# Patient Record
Sex: FEMALE | Race: WHITE | NOT HISPANIC OR LATINO | Employment: PART TIME | URBAN - NONMETROPOLITAN AREA
[De-identification: names, ages, dates, MRNs, and addresses within clinical notes are randomized per-mention and may not be internally consistent; named-entity substitution may affect disease eponyms.]

---

## 2023-01-25 ENCOUNTER — OFFICE VISIT (OUTPATIENT)
Dept: FAMILY MEDICINE CLINIC | Facility: CLINIC | Age: 58
End: 2023-01-25
Payer: COMMERCIAL

## 2023-01-25 VITALS
OXYGEN SATURATION: 98 % | TEMPERATURE: 97.8 F | DIASTOLIC BLOOD PRESSURE: 79 MMHG | HEIGHT: 63 IN | WEIGHT: 183.6 LBS | SYSTOLIC BLOOD PRESSURE: 160 MMHG | RESPIRATION RATE: 20 BRPM | BODY MASS INDEX: 32.53 KG/M2 | HEART RATE: 77 BPM

## 2023-01-25 DIAGNOSIS — I10 BENIGN ESSENTIAL HYPERTENSION: Chronic | ICD-10-CM

## 2023-01-25 DIAGNOSIS — Z11.59 NEED FOR HEPATITIS C SCREENING TEST: ICD-10-CM

## 2023-01-25 DIAGNOSIS — E11.9 CONTROLLED TYPE 2 DIABETES MELLITUS WITHOUT COMPLICATION, WITHOUT LONG-TERM CURRENT USE OF INSULIN: Chronic | ICD-10-CM

## 2023-01-25 DIAGNOSIS — F41.9 ANXIETY: ICD-10-CM

## 2023-01-25 DIAGNOSIS — F33.1 MAJOR DEPRESSIVE DISORDER, RECURRENT, MODERATE: ICD-10-CM

## 2023-01-25 DIAGNOSIS — G57.93 NEUROPATHIC PAIN OF BOTH FEET: Primary | Chronic | ICD-10-CM

## 2023-01-25 PROCEDURE — 99204 OFFICE O/P NEW MOD 45 MIN: CPT | Performed by: FAMILY MEDICINE

## 2023-01-25 RX ORDER — MULTIPLE VITAMINS W/ MINERALS TAB 9MG-400MCG
1 TAB ORAL DAILY
COMMUNITY

## 2023-01-27 ENCOUNTER — PATIENT ROUNDING (BHMG ONLY) (OUTPATIENT)
Dept: FAMILY MEDICINE CLINIC | Facility: CLINIC | Age: 58
End: 2023-01-27
Payer: COMMERCIAL

## 2023-01-27 NOTE — PROGRESS NOTES
January 27, 2023    Hello, may I speak with Johnna Islas?    My name is Cary     I am  with [address] [address] [phone].    Before we get started may I verify your date of birth? 1965    I am calling to officially welcome you to our practice and ask about your recent visit. Is this a good time to talk? No lvm    Tell me about your visit with us. What things went well?         We're always looking for ways to make our patients' experiences even better. Do you have recommendations on ways we may improve?     Overall were you satisfied with your first visit to our practice?        I appreciate you taking the time to speak with me today. Is there anything else I can do for you?       Thank you, and have a great day.

## 2023-02-05 PROBLEM — G57.93 NEUROPATHIC PAIN OF BOTH FEET: Status: ACTIVE | Noted: 2021-04-26

## 2023-02-05 PROBLEM — F41.9 ANXIETY: Status: ACTIVE | Noted: 2023-02-05

## 2023-02-05 PROBLEM — F33.1 MAJOR DEPRESSIVE DISORDER, RECURRENT, MODERATE: Status: ACTIVE | Noted: 2023-02-05

## 2023-02-05 RX ORDER — LISINOPRIL 20 MG/1
20 TABLET ORAL DAILY
COMMUNITY
Start: 2022-11-15

## 2023-05-03 ENCOUNTER — OFFICE VISIT (OUTPATIENT)
Dept: FAMILY MEDICINE CLINIC | Facility: CLINIC | Age: 58
End: 2023-05-03
Payer: COMMERCIAL

## 2023-05-03 ENCOUNTER — LAB (OUTPATIENT)
Dept: LAB | Facility: HOSPITAL | Age: 58
End: 2023-05-03
Payer: COMMERCIAL

## 2023-05-03 VITALS
SYSTOLIC BLOOD PRESSURE: 151 MMHG | HEIGHT: 63 IN | BODY MASS INDEX: 32.67 KG/M2 | DIASTOLIC BLOOD PRESSURE: 80 MMHG | OXYGEN SATURATION: 99 % | RESPIRATION RATE: 12 BRPM | TEMPERATURE: 98.2 F | WEIGHT: 184.4 LBS | HEART RATE: 74 BPM

## 2023-05-03 DIAGNOSIS — E11.610 CONTROLLED TYPE 2 DIABETES MELLITUS WITH DIABETIC NEUROPATHIC ARTHROPATHY, WITHOUT LONG-TERM CURRENT USE OF INSULIN: Chronic | ICD-10-CM

## 2023-05-03 DIAGNOSIS — I10 BENIGN ESSENTIAL HYPERTENSION: Chronic | ICD-10-CM

## 2023-05-03 DIAGNOSIS — F41.9 ANXIETY: Chronic | ICD-10-CM

## 2023-05-03 DIAGNOSIS — E78.1 HYPERTRIGLYCERIDEMIA: Chronic | ICD-10-CM

## 2023-05-03 DIAGNOSIS — E11.42 DIABETIC POLYNEUROPATHY ASSOCIATED WITH TYPE 2 DIABETES MELLITUS: Chronic | ICD-10-CM

## 2023-05-03 DIAGNOSIS — G57.93 NEUROPATHIC PAIN OF BOTH FEET: ICD-10-CM

## 2023-05-03 DIAGNOSIS — Z79.899 MEDICATION MANAGEMENT: ICD-10-CM

## 2023-05-03 DIAGNOSIS — F41.9 ANXIETY: ICD-10-CM

## 2023-05-03 DIAGNOSIS — J02.0 STREP PHARYNGITIS: Primary | ICD-10-CM

## 2023-05-03 DIAGNOSIS — E11.9 CONTROLLED TYPE 2 DIABETES MELLITUS WITHOUT COMPLICATION, WITHOUT LONG-TERM CURRENT USE OF INSULIN: ICD-10-CM

## 2023-05-03 DIAGNOSIS — I10 BENIGN ESSENTIAL HYPERTENSION: ICD-10-CM

## 2023-05-03 LAB
ALBUMIN SERPL-MCNC: 4.4 G/DL (ref 3.5–5.2)
ALBUMIN UR-MCNC: <1.2 MG/DL
ALBUMIN/GLOB SERPL: 1.9 G/DL
ALP SERPL-CCNC: 42 U/L (ref 39–117)
ALT SERPL W P-5'-P-CCNC: 21 U/L (ref 1–33)
ANION GAP SERPL CALCULATED.3IONS-SCNC: 12.3 MMOL/L (ref 5–15)
AST SERPL-CCNC: 20 U/L (ref 1–32)
BILIRUB SERPL-MCNC: 0.3 MG/DL (ref 0–1.2)
BUN SERPL-MCNC: 11 MG/DL (ref 6–20)
BUN/CREAT SERPL: 23.4 (ref 7–25)
CALCIUM SPEC-SCNC: 10.2 MG/DL (ref 8.6–10.5)
CHLORIDE SERPL-SCNC: 100 MMOL/L (ref 98–107)
CHOLEST SERPL-MCNC: 151 MG/DL (ref 0–200)
CO2 SERPL-SCNC: 23.7 MMOL/L (ref 22–29)
CREAT SERPL-MCNC: 0.47 MG/DL (ref 0.57–1)
CREAT UR-MCNC: 51.3 MG/DL
DEPRECATED RDW RBC AUTO: 38.8 FL (ref 37–54)
EGFRCR SERPLBLD CKD-EPI 2021: 111.9 ML/MIN/1.73
ERYTHROCYTE [DISTWIDTH] IN BLOOD BY AUTOMATED COUNT: 12.8 % (ref 12.3–15.4)
FOLATE SERPL-MCNC: >20 NG/ML (ref 4.78–24.2)
GLOBULIN UR ELPH-MCNC: 2.3 GM/DL
GLUCOSE SERPL-MCNC: 113 MG/DL (ref 65–99)
HBA1C MFR BLD: 6 % (ref 4.8–5.6)
HCT VFR BLD AUTO: 37.4 % (ref 34–46.6)
HDLC SERPL-MCNC: 25 MG/DL (ref 40–60)
HGB BLD-MCNC: 12.9 G/DL (ref 12–15.9)
LDLC SERPL CALC-MCNC: 65 MG/DL (ref 0–100)
LDLC/HDLC SERPL: 1.9 {RATIO}
MCH RBC QN AUTO: 29.5 PG (ref 26.6–33)
MCHC RBC AUTO-ENTMCNC: 34.5 G/DL (ref 31.5–35.7)
MCV RBC AUTO: 85.6 FL (ref 79–97)
MICROALBUMIN/CREAT UR: NORMAL MG/G{CREAT}
PLATELET # BLD AUTO: 270 10*3/MM3 (ref 140–450)
PMV BLD AUTO: 9.6 FL (ref 6–12)
POC AMPHETAMINES: NEGATIVE
POC BARBITURATES: NEGATIVE
POC BENZODIAZEPHINES: NEGATIVE
POC COCAINE: NEGATIVE
POC METHADONE: NEGATIVE
POC METHAMPHETAMINE SCREEN URINE: NEGATIVE
POC OPIATES: NEGATIVE
POC OXYCODONE: NEGATIVE
POC PHENCYCLIDINE: NEGATIVE
POC PROPOXYPHENE: NEGATIVE
POC THC: NEGATIVE
POC TRICYCLIC ANTIDEPRESSANTS: NEGATIVE
POTASSIUM SERPL-SCNC: 4.3 MMOL/L (ref 3.5–5.2)
PROT SERPL-MCNC: 6.7 G/DL (ref 6–8.5)
RBC # BLD AUTO: 4.37 10*6/MM3 (ref 3.77–5.28)
SODIUM SERPL-SCNC: 136 MMOL/L (ref 136–145)
T4 FREE SERPL-MCNC: 1.08 NG/DL (ref 0.93–1.7)
TRIGL SERPL-MCNC: 393 MG/DL (ref 0–150)
TSH SERPL DL<=0.05 MIU/L-ACNC: 2.9 UIU/ML (ref 0.27–4.2)
VIT B12 BLD-MCNC: >2000 PG/ML (ref 211–946)
VLDLC SERPL-MCNC: 61 MG/DL (ref 5–40)
WBC NRBC COR # BLD: 6.81 10*3/MM3 (ref 3.4–10.8)

## 2023-05-03 PROCEDURE — 80305 DRUG TEST PRSMV DIR OPT OBS: CPT | Performed by: FAMILY MEDICINE

## 2023-05-03 PROCEDURE — 82570 ASSAY OF URINE CREATININE: CPT

## 2023-05-03 PROCEDURE — 80061 LIPID PANEL: CPT

## 2023-05-03 PROCEDURE — 80050 GENERAL HEALTH PANEL: CPT

## 2023-05-03 PROCEDURE — 82607 VITAMIN B-12: CPT

## 2023-05-03 PROCEDURE — 36415 COLL VENOUS BLD VENIPUNCTURE: CPT

## 2023-05-03 PROCEDURE — 84439 ASSAY OF FREE THYROXINE: CPT

## 2023-05-03 PROCEDURE — 83036 HEMOGLOBIN GLYCOSYLATED A1C: CPT

## 2023-05-03 PROCEDURE — 82746 ASSAY OF FOLIC ACID SERUM: CPT

## 2023-05-03 PROCEDURE — 82043 UR ALBUMIN QUANTITATIVE: CPT

## 2023-05-03 PROCEDURE — 99214 OFFICE O/P EST MOD 30 MIN: CPT | Performed by: FAMILY MEDICINE

## 2023-05-03 RX ORDER — AMOXICILLIN AND CLAVULANATE POTASSIUM 875; 125 MG/1; MG/1
1 TABLET, FILM COATED ORAL 2 TIMES DAILY
Qty: 20 TABLET | Refills: 0 | Status: SHIPPED | OUTPATIENT
Start: 2023-05-03

## 2023-05-03 RX ORDER — LISINOPRIL 30 MG/1
30 TABLET ORAL DAILY
Qty: 30 TABLET | Refills: 2 | Status: SHIPPED | OUTPATIENT
Start: 2023-05-03

## 2023-05-03 RX ORDER — ASPIRIN 81 MG/1
81 TABLET ORAL DAILY
Qty: 90 TABLET | Refills: 3 | Status: SHIPPED | OUTPATIENT
Start: 2023-05-03

## 2023-05-03 RX ORDER — GABAPENTIN 300 MG/1
300 CAPSULE ORAL 2 TIMES DAILY
Qty: 180 CAPSULE | Refills: 1 | Status: SHIPPED | OUTPATIENT
Start: 2023-05-03

## 2023-05-03 RX ORDER — ATORVASTATIN CALCIUM 10 MG/1
10 TABLET, FILM COATED ORAL EVERY EVENING
Qty: 90 TABLET | Refills: 3 | Status: SHIPPED | OUTPATIENT
Start: 2023-05-03

## 2023-05-03 RX ORDER — ASPIRIN 81 MG/1
81 TABLET ORAL DAILY
COMMUNITY
End: 2023-05-03 | Stop reason: SDUPTHER

## 2023-05-03 RX ORDER — METFORMIN HYDROCHLORIDE 500 MG/1
1000 TABLET, EXTENDED RELEASE ORAL 2 TIMES DAILY WITH MEALS
Qty: 360 TABLET | Refills: 3 | Status: SHIPPED | OUTPATIENT
Start: 2023-05-03

## 2023-05-03 NOTE — PROGRESS NOTES
"Chief Complaint  Med Refill and Annual Exam    Subjective        Johnna Islas presents to [facility]  History of Present Illness    Presents to follow up on medications and get refills, has chronic conditions type 2 diabetes on metformin, hyperlipidemia and high triglycerides related to diabetes, obesity, hypertension, diabetic neuropathy on medicine to treat and manage and complaining of sore throat, strep exposures at home without fever. Symptoms for a few days, no cough, short of breath or other. Has had sinus pain pressure for a week or longer though, over the counter medicines not helping this or throat.     Objective   Vital Signs:  /80   Pulse 74   Temp 98.2 °F (36.8 °C)   Resp 12   Ht 160 cm (63\")   Wt 83.6 kg (184 lb 6.4 oz)   SpO2 99%   BMI 32.66 kg/m²   Estimated body mass index is 32.66 kg/m² as calculated from the following:    Height as of this encounter: 160 cm (63\").    Weight as of this encounter: 83.6 kg (184 lb 6.4 oz).       BMI is >= 30 and <35. (Class 1 Obesity). The following options were offered after discussion;: exercise counseling/recommendations      Physical Exam  Vitals reviewed.   Constitutional:       Appearance: Normal appearance. She is well-developed.   HENT:      Head: Normocephalic and atraumatic.      Right Ear: External ear normal.      Left Ear: External ear normal.      Nose: Congestion present.      Right Sinus: Frontal sinus tenderness present.      Left Sinus: Frontal sinus tenderness present.      Mouth/Throat:      Pharynx: Oropharyngeal exudate and posterior oropharyngeal erythema present.   Eyes:      Conjunctiva/sclera: Conjunctivae normal.      Pupils: Pupils are equal, round, and reactive to light.   Cardiovascular:      Rate and Rhythm: Normal rate.   Pulmonary:      Effort: Pulmonary effort is normal.      Breath sounds: Normal breath sounds.   Abdominal:      General: There is no distension.   Feet:      " Comments: Decreased sensation in feet bilaterally, chronic neuropathy noted  Skin:     General: Skin is warm and dry.   Neurological:      General: No focal deficit present.      Mental Status: She is alert and oriented to person, place, and time.   Psychiatric:         Mood and Affect: Mood and affect normal.         Behavior: Behavior normal.         Thought Content: Thought content normal.         Judgment: Judgment normal.        Result Review :  The following data was reviewed by: Robson Jasso DO on 05/03/2023:  Common labs        11/15/2022    11:44 11/15/2022    13:13 5/3/2023    09:52 5/3/2023    10:08   Common Labs   Glucose   113      BUN   11      Creatinine   0.47      Sodium   136      Potassium   4.3      Chloride   100      Calcium   10.2      Albumin   4.4      Total Bilirubin   0.3      Alkaline Phosphatase   42      AST (SGOT)   20      ALT (SGPT)   21      WBC 7.7       6.81      Hemoglobin 12.6       12.9      Hematocrit 37.2       37.4      Platelets 313       270      Total Cholesterol   151      Total Cholesterol 151           Triglycerides 497       393      HDL Cholesterol 27       25      LDL Cholesterol  51       65      Hemoglobin A1C 6.1       6.00      Microalbumin, Urine  10       <1.2         This result is from an external source.                  Assessment and Plan   Diagnoses and all orders for this visit:    1. Strep pharyngitis (Primary)  -     amoxicillin-clavulanate (Augmentin) 875-125 MG per tablet; Take 1 tablet by mouth 2 (Two) Times a Day.  Dispense: 20 tablet; Refill: 0    2. Benign essential hypertension  Assessment & Plan:  Hypertension is unchanged.  Weight loss.  Regular aerobic exercise.  Medication changes per orders.  Blood pressure will be reassessed in 4 weeks   Goal <140/90    Orders:  -     Hemoglobin A1c; Standing  -     Comprehensive Metabolic Panel; Standing  -     Lipid Panel; Standing  -     Vitamin B12 & Folate; Standing  -     Microalbumin / Creatinine  Urine Ratio - Urine, Clean Catch; Future  -     TSH+Free T4; Standing  -     CBC (No Diff); Future  -     lisinopril (PRINIVIL,ZESTRIL) 30 MG tablet; Take 1 tablet by mouth Daily.  Dispense: 30 tablet; Refill: 2  -     aspirin 81 MG EC tablet; Take 1 tablet by mouth Daily.  Dispense: 90 tablet; Refill: 3    3. Diabetic polyneuropathy associated with type 2 diabetes mellitus  Assessment & Plan:  *manage  Gabapentin reviewed with patient, helps manage symptoms  Has longstanding diabetes and symptoms, complaints consistent with neuropathy  KALI reviewed, contract UDS appropriate and continue as prescribed    Orders:  -     Hemoglobin A1c; Standing  -     Comprehensive Metabolic Panel; Standing  -     Lipid Panel; Standing  -     Vitamin B12 & Folate; Standing  -     Microalbumin / Creatinine Urine Ratio - Urine, Clean Catch; Future  -     TSH+Free T4; Standing  -     POC Urine Drug Screen, Triage  -     CBC (No Diff); Future  -     gabapentin (NEURONTIN) 300 MG capsule; Take 1 capsule by mouth 2 (Two) Times a Day.  Dispense: 180 capsule; Refill: 1    4. Controlled type 2 diabetes mellitus with diabetic neuropathic arthropathy, without long-term current use of insulin  Assessment & Plan:  Diabetes is improving with treatment.   Continue current treatment regimen.  Diabetes will be reassessed in 3 months   Goal a1c <7, continue metformin  Recommend yearly eye and foot exam, update vaccinations     Orders:  -     Hemoglobin A1c; Standing  -     Comprehensive Metabolic Panel; Standing  -     Lipid Panel; Standing  -     Vitamin B12 & Folate; Standing  -     Microalbumin / Creatinine Urine Ratio - Urine, Clean Catch; Future  -     TSH+Free T4; Standing  -     CBC (No Diff); Future  -     metFORMIN ER (GLUCOPHAGE-XR) 500 MG 24 hr tablet; Take 2 tablets by mouth 2 (Two) Times a Day With Meals.  Dispense: 360 tablet; Refill: 3    5. Anxiety  Assessment & Plan:  *manage  Continue medicines and treatment at home  Follow up if  worse or more concerning symptoms, panic attacks or other    Orders:  -     Hemoglobin A1c; Standing  -     Comprehensive Metabolic Panel; Standing  -     Lipid Panel; Standing  -     Vitamin B12 & Folate; Standing  -     Microalbumin / Creatinine Urine Ratio - Urine, Clean Catch; Future  -     TSH+Free T4; Standing  -     CBC (No Diff); Future    6. Medication management  -     CBC (No Diff); Future    7. Hypertriglyceridemia  Assessment & Plan:  Lipid abnormalities are improving with treatment.  Pharmacotherapy as ordered.  Lipids will be reassessed in 3 months   Recheck with labs regularly, LDL goal <70  Lab Results   Component Value Date    CHOL 151 05/03/2023    CHLPL 151 11/15/2022    TRIG 393 (H) 05/03/2023    HDL 25 (L) 05/03/2023    LDL 65 05/03/2023         Orders:  -     atorvastatin (LIPITOR) 10 MG tablet; Take 1 tablet by mouth Every Evening.  Dispense: 90 tablet; Refill: 3           Follow Up   Return in about 3 months (around 8/3/2023) for Next scheduled follow up, Recheck.  Patient was given instructions and counseling regarding her condition or for health maintenance advice. Please see specific information pulled into the AVS if appropriate.       Answers for HPI/ROS submitted by the patient on 5/2/2023  Please describe your symptoms.: Labs and med refill  Have you had these symptoms before?: No  How long have you been having these symptoms?: Greater than 2 weeks  What is the primary reason for your visit?: Other

## 2023-05-21 PROBLEM — J32.9 SINUSITIS: Status: ACTIVE | Noted: 2023-05-03

## 2023-05-21 PROBLEM — J02.0 STREP PHARYNGITIS: Status: ACTIVE | Noted: 2023-05-21

## 2023-05-21 PROBLEM — G57.93 NEUROPATHIC PAIN OF BOTH FEET: Chronic | Status: ACTIVE | Noted: 2021-04-26

## 2023-05-21 PROBLEM — F41.9 ANXIETY: Chronic | Status: ACTIVE | Noted: 2023-02-05

## 2023-05-21 NOTE — ASSESSMENT & PLAN NOTE
Treatment with antibiotics, follow up if no better or further development of symptoms or other concerns as appropriate  Counseled on continued treatment at home with over the counter medicines and avoiding allergens, triggers, getting plenty of rest, fluids

## 2023-05-21 NOTE — ASSESSMENT & PLAN NOTE
*manage  Continue medicines and treatment at home  Follow up if worse or more concerning symptoms, panic attacks or other

## 2023-05-21 NOTE — ASSESSMENT & PLAN NOTE
Hypertension is unchanged.  Weight loss.  Regular aerobic exercise.  Medication changes per orders.  Blood pressure will be reassessed in 4 weeks   Goal <140/90

## 2023-05-21 NOTE — ASSESSMENT & PLAN NOTE
*manage  Gabapentin reviewed with patient, helps manage symptoms  Has longstanding diabetes and symptoms, complaints consistent with neuropathy  KALI reviewed, contract UDS appropriate and continue as prescribed

## 2023-05-21 NOTE — ASSESSMENT & PLAN NOTE
Lipid abnormalities are improving with treatment.  Pharmacotherapy as ordered.  Lipids will be reassessed in 3 months   Recheck with labs regularly, LDL goal <70  Lab Results   Component Value Date    CHOL 151 05/03/2023    CHLPL 151 11/15/2022    TRIG 393 (H) 05/03/2023    HDL 25 (L) 05/03/2023    LDL 65 05/03/2023

## 2023-05-21 NOTE — ASSESSMENT & PLAN NOTE
Diabetes is improving with treatment.   Continue current treatment regimen.  Diabetes will be reassessed in 3 months   Goal a1c <7, continue metformin  Recommend yearly eye and foot exam, update vaccinations

## 2023-07-21 ENCOUNTER — OFFICE VISIT (OUTPATIENT)
Dept: FAMILY MEDICINE CLINIC | Facility: CLINIC | Age: 58
End: 2023-07-21
Payer: COMMERCIAL

## 2023-07-21 VITALS
SYSTOLIC BLOOD PRESSURE: 143 MMHG | BODY MASS INDEX: 32.46 KG/M2 | TEMPERATURE: 98 F | WEIGHT: 183.2 LBS | HEIGHT: 63 IN | DIASTOLIC BLOOD PRESSURE: 78 MMHG | HEART RATE: 69 BPM | OXYGEN SATURATION: 100 %

## 2023-07-21 DIAGNOSIS — J40 BRONCHITIS: Primary | ICD-10-CM

## 2023-07-21 DIAGNOSIS — I10 BENIGN ESSENTIAL HYPERTENSION: Chronic | ICD-10-CM

## 2023-07-21 DIAGNOSIS — E11.9 CONTROLLED TYPE 2 DIABETES MELLITUS WITHOUT COMPLICATION, WITHOUT LONG-TERM CURRENT USE OF INSULIN: Chronic | ICD-10-CM

## 2023-07-21 PROCEDURE — 99214 OFFICE O/P EST MOD 30 MIN: CPT

## 2023-07-21 RX ORDER — METHYLPREDNISOLONE 4 MG/1
TABLET ORAL
Qty: 1 EACH | Refills: 0 | Status: SHIPPED | OUTPATIENT
Start: 2023-07-21

## 2023-07-21 RX ORDER — BENZONATATE 200 MG/1
CAPSULE ORAL
COMMUNITY
Start: 2023-07-20

## 2023-07-21 RX ORDER — ALBUTEROL SULFATE 90 UG/1
AEROSOL, METERED RESPIRATORY (INHALATION)
COMMUNITY
Start: 2023-07-20

## 2023-07-21 RX ORDER — AMOXICILLIN AND CLAVULANATE POTASSIUM 875; 125 MG/1; MG/1
1 TABLET, FILM COATED ORAL 2 TIMES DAILY
Qty: 20 TABLET | Refills: 0 | Status: SHIPPED | OUTPATIENT
Start: 2023-07-21 | End: 2023-07-31

## 2023-07-21 NOTE — PROGRESS NOTES
Chief Complaint   Patient presents with    Cough     Symptoms started Monday, cough is making her hoarse. She took a home test yesterday morning and it was negative, she also done an E-visit yesterday and was told she had a cold.     Nasal Congestion     Chest congestion     Ear Fullness       Subjective          Johnna Islas presents to [facility]    History of Present Illness  Johnna is here to be seen for sinus symptoms that started 2 weeks ago and now she has started getting the deep cough with green mucus on Monday. She also has ear fullness and chest congestion. 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 She saw an e-visit provider yesterday and was given tessalon perles, mucinex, and albuterol inhaler.     She has a history of hypertension (/78 today) and Type 2 Diabetes that is controlled with metformin. Due to her being sick for over 2 weeks I do not believe testing her for covid/flu is necessary at this point. This seems to be a secondary infection.     Upon examination her left tympanic membrane is bulging. Lungs are clear PRINCESS. She does have green mucus she is coughing up.     Past History:  Medical History: has a past medical history of Bell's palsy, Diabetes mellitus, Hyperlipidemia, and Hypertension.   Surgical History: has a past surgical history that includes Hysterectomy;  section; Tonsillectomy; Adenoidectomy; Cholecystectomy; and Appendectomy.   Family History: family history includes Atrial fibrillation in her mother; Diabetes in her father; Heart disease in her father; Hyperlipidemia in her father; Hypertension in her mother; Stroke in her father; Thyroid disease in her mother.   Social History: reports that she has never smoked. She has never been exposed to tobacco smoke. She has never used smokeless tobacco. She reports that she does not currently use alcohol. She reports that she does not use  "drugs.  Allergies: Fenofibrate micronized, Oxycodone-acetaminophen, and Measles mumps and rubella virus vaccine live  (Not in a hospital admission)       Social History     Socioeconomic History    Marital status:    Tobacco Use    Smoking status: Never     Passive exposure: Never    Smokeless tobacco: Never   Vaping Use    Vaping Use: Never used   Substance and Sexual Activity    Alcohol use: Not Currently    Drug use: Never    Sexual activity: Defer       There are no preventive care reminders to display for this patient.    Objective     Vital Signs:   /78 (BP Location: Left arm, Patient Position: Sitting)   Pulse 69   Temp 98 °F (36.7 °C) (Infrared)   Ht 160 cm (63\")   Wt 83.1 kg (183 lb 3.2 oz)   SpO2 100%   BMI 32.45 kg/m²       Physical Exam  Constitutional:       Appearance: Normal appearance.   HENT:      Right Ear: Tympanic membrane normal.      Left Ear: Tympanic membrane is bulging.      Nose: Nose normal.      Mouth/Throat:      Mouth: Mucous membranes are moist.   Cardiovascular:      Rate and Rhythm: Normal rate and regular rhythm.      Pulses: Normal pulses.      Heart sounds: Normal heart sounds.   Pulmonary:      Effort: Pulmonary effort is normal.      Breath sounds: Normal breath sounds.   Skin:     General: Skin is warm and dry.   Neurological:      General: No focal deficit present.      Mental Status: She is alert and oriented to person, place, and time.   Psychiatric:         Mood and Affect: Mood normal.         Behavior: Behavior normal.        Review of Systems   HENT:  Positive for congestion and ear pain.    Respiratory:  Positive for cough.    All other systems reviewed and are negative.     Result Review :                 Assessment and Plan    Diagnoses and all orders for this visit:    1. Bronchitis (Primary)  -     methylPREDNISolone (MEDROL) 4 MG dose pack; Take as directed on package instructions.  Dispense: 1 each; Refill: 0  -     amoxicillin-clavulanate " (AUGMENTIN) 875-125 MG per tablet; Take 1 tablet by mouth 2 (Two) Times a Day for 10 days.  Dispense: 20 tablet; Refill: 0    2. Controlled type 2 diabetes mellitus without complication, without long-term current use of insulin  Comments:  Check blood sugar at home daily to monitor for elevation due to methylprednisolone use.    3. Benign essential hypertension  Comments:  /78  Continue lisinopril 30 mg daily  Check daily while on medrol dose pack. Discontinue medrol dose pack if symptomatic elevation present.          Pt thought to be clinically stable at this time.    Follow Up   Return if symptoms worsen or fail to improve.  Patient was given instructions and counseling regarding her condition or for health maintenance advice. Please see specific information pulled into the AVS if appropriate.

## 2023-07-24 DIAGNOSIS — J40 BRONCHITIS: Primary | ICD-10-CM

## 2023-07-24 RX ORDER — AZITHROMYCIN 500 MG/1
500 TABLET, FILM COATED ORAL DAILY
Qty: 5 TABLET | Refills: 0 | Status: SHIPPED | OUTPATIENT
Start: 2023-07-24 | End: 2023-07-29

## 2023-08-30 DIAGNOSIS — I10 BENIGN ESSENTIAL HYPERTENSION: Chronic | ICD-10-CM

## 2023-08-31 RX ORDER — LISINOPRIL 30 MG/1
30 TABLET ORAL DAILY
Qty: 30 TABLET | Refills: 2 | Status: SHIPPED | OUTPATIENT
Start: 2023-08-31

## 2023-09-25 ENCOUNTER — OFFICE VISIT (OUTPATIENT)
Dept: FAMILY MEDICINE CLINIC | Facility: CLINIC | Age: 58
End: 2023-09-25

## 2023-09-25 VITALS
WEIGHT: 183.2 LBS | HEART RATE: 68 BPM | OXYGEN SATURATION: 98 % | HEIGHT: 63 IN | TEMPERATURE: 97.7 F | BODY MASS INDEX: 32.46 KG/M2 | DIASTOLIC BLOOD PRESSURE: 78 MMHG | SYSTOLIC BLOOD PRESSURE: 143 MMHG

## 2023-09-25 DIAGNOSIS — E78.1 HYPERTRIGLYCERIDEMIA: Chronic | ICD-10-CM

## 2023-09-25 DIAGNOSIS — E11.42 DIABETIC POLYNEUROPATHY ASSOCIATED WITH TYPE 2 DIABETES MELLITUS: Chronic | ICD-10-CM

## 2023-09-25 DIAGNOSIS — I10 BENIGN ESSENTIAL HYPERTENSION: Chronic | ICD-10-CM

## 2023-09-25 DIAGNOSIS — E11.610 CONTROLLED TYPE 2 DIABETES MELLITUS WITH DIABETIC NEUROPATHIC ARTHROPATHY, WITHOUT LONG-TERM CURRENT USE OF INSULIN: Chronic | ICD-10-CM

## 2023-09-25 PROCEDURE — 99214 OFFICE O/P EST MOD 30 MIN: CPT

## 2023-09-25 RX ORDER — LISINOPRIL 30 MG/1
30 TABLET ORAL DAILY
Qty: 90 TABLET | Refills: 3 | Status: SHIPPED | OUTPATIENT
Start: 2023-09-25

## 2023-09-25 RX ORDER — METFORMIN HYDROCHLORIDE 500 MG/1
1000 TABLET, EXTENDED RELEASE ORAL 2 TIMES DAILY WITH MEALS
Qty: 360 TABLET | Refills: 3 | Status: SHIPPED | OUTPATIENT
Start: 2023-09-25

## 2023-09-25 RX ORDER — ATORVASTATIN CALCIUM 10 MG/1
10 TABLET, FILM COATED ORAL EVERY EVENING
Qty: 90 TABLET | Refills: 3 | Status: SHIPPED | OUTPATIENT
Start: 2023-09-25

## 2023-09-25 RX ORDER — GABAPENTIN 300 MG/1
300 CAPSULE ORAL 2 TIMES DAILY
Qty: 180 CAPSULE | Refills: 1 | Status: CANCELLED | OUTPATIENT
Start: 2023-09-25

## 2023-09-25 NOTE — ASSESSMENT & PLAN NOTE
Hypertension is unchanged.  Continue current medications.  Blood pressure will be reassessed in 3 months.

## 2023-09-25 NOTE — PROGRESS NOTES
Chief Complaint   Patient presents with    Med Refill     Here for medication refills and fasting as well if she needs to do labs.        Subjective          Johnna Islas presents to [facility]    History of Present Illness  Larisa is here to be seen for medication refills. She needs all of her medications refilled and wants the non controlled medications 90 days at a time. She has a BMI of 32.45 and a BP of 143/78. She has no complaints today. She is not due for labs until December.     Reviewed labs and medications    Past History:  Medical History: has a past medical history of Bell's palsy, Diabetes mellitus, Hyperlipidemia, and Hypertension.   Surgical History: has a past surgical history that includes Hysterectomy;  section; Tonsillectomy; Adenoidectomy; Cholecystectomy; and Appendectomy.   Family History: family history includes Atrial fibrillation in her mother; Diabetes in her father; Heart disease in her father; Hyperlipidemia in her father; Hypertension in her mother; Stroke in her father; Thyroid disease in her mother.   Social History: reports that she has never smoked. She has never been exposed to tobacco smoke. She has never used smokeless tobacco. She reports that she does not currently use alcohol. She reports that she does not use drugs.  Allergies: Fenofibrate micronized, Oxycodone-acetaminophen, and Measles mumps and rubella virus vaccine live  (Not in a hospital admission)       Social History     Socioeconomic History    Marital status:    Tobacco Use    Smoking status: Never     Passive exposure: Never    Smokeless tobacco: Never   Vaping Use    Vaping Use: Never used   Substance and Sexual Activity    Alcohol use: Not Currently    Drug use: Never    Sexual activity: Defer       There are no preventive care reminders to display for this patient.    Objective     Vital Signs:   /78 (BP Location: Left arm, Patient Position: Sitting)    "Pulse 68   Temp 97.7 °F (36.5 °C) (Infrared)   Ht 160 cm (63\")   Wt 83.1 kg (183 lb 3.2 oz)   SpO2 98%   BMI 32.45 kg/m²       Physical Exam  Constitutional:       Appearance: Normal appearance.   HENT:      Nose: Nose normal.      Mouth/Throat:      Mouth: Mucous membranes are moist.   Cardiovascular:      Rate and Rhythm: Normal rate and regular rhythm.      Pulses: Normal pulses.      Heart sounds: Normal heart sounds.   Pulmonary:      Effort: Pulmonary effort is normal.      Breath sounds: Normal breath sounds.   Skin:     General: Skin is warm and dry.   Neurological:      General: No focal deficit present.      Mental Status: She is alert and oriented to person, place, and time.   Psychiatric:         Mood and Affect: Mood normal.         Behavior: Behavior normal.        Review of Systems     Result Review :                 Assessment and Plan    Diagnoses and all orders for this visit:    1. Benign essential hypertension  Assessment & Plan:  Hypertension is unchanged.  Continue current medications.  Blood pressure will be reassessed in 3 months.    Orders:  -     lisinopril (PRINIVIL,ZESTRIL) 30 MG tablet; Take 1 tablet by mouth Daily.  Dispense: 90 tablet; Refill: 3    2. Controlled type 2 diabetes mellitus with diabetic neuropathic arthropathy, without long-term current use of insulin  Assessment & Plan:  Diabetes is improving with treatment.   Continue current treatment regimen.  Diabetes will be reassessed in 3 months.    Orders:  -     metFORMIN ER (GLUCOPHAGE-XR) 500 MG 24 hr tablet; Take 2 tablets by mouth 2 (Two) Times a Day With Meals.  Dispense: 360 tablet; Refill: 3    3. Hypertriglyceridemia  Assessment & Plan:  Lipid abnormalities are unchanged.  Pharmacotherapy as ordered.  Lipids will be reassessed in 3 months.    Orders:  -     atorvastatin (LIPITOR) 10 MG tablet; Take 1 tablet by mouth Every Evening.  Dispense: 90 tablet; Refill: 3    4. Diabetic polyneuropathy associated with type 2 " diabetes mellitus  Comments:  Continue on gabapentin as ordered.          Pt thought to be clinically stable at this time.    Follow Up   Return if symptoms worsen or fail to improve.  Patient was given instructions and counseling regarding her condition or for health maintenance advice. Please see specific information pulled into the AVS if appropriate.

## 2023-11-15 ENCOUNTER — OFFICE VISIT (OUTPATIENT)
Dept: FAMILY MEDICINE CLINIC | Facility: CLINIC | Age: 58
End: 2023-11-15
Payer: COMMERCIAL

## 2023-11-15 ENCOUNTER — LAB (OUTPATIENT)
Dept: LAB | Facility: HOSPITAL | Age: 58
End: 2023-11-15
Payer: COMMERCIAL

## 2023-11-15 VITALS
SYSTOLIC BLOOD PRESSURE: 152 MMHG | TEMPERATURE: 98.2 F | DIASTOLIC BLOOD PRESSURE: 75 MMHG | RESPIRATION RATE: 16 BRPM | BODY MASS INDEX: 32.07 KG/M2 | WEIGHT: 181 LBS | HEIGHT: 63 IN | OXYGEN SATURATION: 94 % | HEART RATE: 75 BPM

## 2023-11-15 DIAGNOSIS — N64.4 NIPPLE PAIN: ICD-10-CM

## 2023-11-15 DIAGNOSIS — J01.10 ACUTE NON-RECURRENT FRONTAL SINUSITIS: ICD-10-CM

## 2023-11-15 DIAGNOSIS — F41.9 ANXIETY: ICD-10-CM

## 2023-11-15 DIAGNOSIS — H65.01 NON-RECURRENT ACUTE SEROUS OTITIS MEDIA OF RIGHT EAR: ICD-10-CM

## 2023-11-15 DIAGNOSIS — E11.42 DIABETIC POLYNEUROPATHY ASSOCIATED WITH TYPE 2 DIABETES MELLITUS: Chronic | ICD-10-CM

## 2023-11-15 DIAGNOSIS — E78.1 HYPERTRIGLYCERIDEMIA: Chronic | ICD-10-CM

## 2023-11-15 DIAGNOSIS — I10 BENIGN ESSENTIAL HYPERTENSION: Chronic | ICD-10-CM

## 2023-11-15 DIAGNOSIS — E11.9 CONTROLLED TYPE 2 DIABETES MELLITUS WITHOUT COMPLICATION, WITHOUT LONG-TERM CURRENT USE OF INSULIN: ICD-10-CM

## 2023-11-15 DIAGNOSIS — I10 BENIGN ESSENTIAL HYPERTENSION: ICD-10-CM

## 2023-11-15 DIAGNOSIS — E11.610 CONTROLLED TYPE 2 DIABETES MELLITUS WITH DIABETIC NEUROPATHIC ARTHROPATHY, WITHOUT LONG-TERM CURRENT USE OF INSULIN: Primary | ICD-10-CM

## 2023-11-15 DIAGNOSIS — G57.93 NEUROPATHIC PAIN OF BOTH FEET: ICD-10-CM

## 2023-11-15 DIAGNOSIS — E11.610 CONTROLLED TYPE 2 DIABETES MELLITUS WITH DIABETIC NEUROPATHIC ARTHROPATHY, WITHOUT LONG-TERM CURRENT USE OF INSULIN: ICD-10-CM

## 2023-11-15 LAB
ALBUMIN SERPL-MCNC: 4.5 G/DL (ref 3.5–5.2)
ALBUMIN/GLOB SERPL: 1.7 G/DL
ALP SERPL-CCNC: 42 U/L (ref 39–117)
ALT SERPL W P-5'-P-CCNC: 25 U/L (ref 1–33)
ANION GAP SERPL CALCULATED.3IONS-SCNC: 13.1 MMOL/L (ref 5–15)
AST SERPL-CCNC: 22 U/L (ref 1–32)
BILIRUB SERPL-MCNC: 0.4 MG/DL (ref 0–1.2)
BUN SERPL-MCNC: 10 MG/DL (ref 6–20)
BUN/CREAT SERPL: 15.2 (ref 7–25)
CALCIUM SPEC-SCNC: 10.1 MG/DL (ref 8.6–10.5)
CHLORIDE SERPL-SCNC: 97 MMOL/L (ref 98–107)
CHOLEST SERPL-MCNC: 165 MG/DL (ref 0–200)
CO2 SERPL-SCNC: 23.9 MMOL/L (ref 22–29)
CREAT SERPL-MCNC: 0.66 MG/DL (ref 0.57–1)
EGFRCR SERPLBLD CKD-EPI 2021: 102.5 ML/MIN/1.73
FOLATE SERPL-MCNC: >20 NG/ML (ref 4.78–24.2)
GLOBULIN UR ELPH-MCNC: 2.6 GM/DL
GLUCOSE SERPL-MCNC: 101 MG/DL (ref 65–99)
HBA1C MFR BLD: 6 % (ref 4.8–5.6)
HDLC SERPL-MCNC: 28 MG/DL (ref 40–60)
LDLC SERPL CALC-MCNC: 69 MG/DL (ref 0–100)
LDLC/HDLC SERPL: 1.81 {RATIO}
POTASSIUM SERPL-SCNC: 4.6 MMOL/L (ref 3.5–5.2)
PROT SERPL-MCNC: 7.1 G/DL (ref 6–8.5)
SODIUM SERPL-SCNC: 134 MMOL/L (ref 136–145)
T4 FREE SERPL-MCNC: 1.08 NG/DL (ref 0.93–1.7)
TRIGL SERPL-MCNC: 431 MG/DL (ref 0–150)
TSH SERPL DL<=0.05 MIU/L-ACNC: 2.02 UIU/ML (ref 0.27–4.2)
VIT B12 BLD-MCNC: >2000 PG/ML (ref 211–946)
VLDLC SERPL-MCNC: 68 MG/DL (ref 5–40)

## 2023-11-15 PROCEDURE — 36415 COLL VENOUS BLD VENIPUNCTURE: CPT

## 2023-11-15 PROCEDURE — 84439 ASSAY OF FREE THYROXINE: CPT

## 2023-11-15 PROCEDURE — 80061 LIPID PANEL: CPT

## 2023-11-15 PROCEDURE — 84443 ASSAY THYROID STIM HORMONE: CPT

## 2023-11-15 PROCEDURE — 82746 ASSAY OF FOLIC ACID SERUM: CPT

## 2023-11-15 PROCEDURE — 83036 HEMOGLOBIN GLYCOSYLATED A1C: CPT

## 2023-11-15 PROCEDURE — 82607 VITAMIN B-12: CPT

## 2023-11-15 PROCEDURE — 80053 COMPREHEN METABOLIC PANEL: CPT

## 2023-11-15 PROCEDURE — 99214 OFFICE O/P EST MOD 30 MIN: CPT

## 2023-11-15 RX ORDER — LISINOPRIL 40 MG/1
40 TABLET ORAL DAILY
Qty: 30 TABLET | Refills: 3 | Status: SHIPPED | OUTPATIENT
Start: 2023-11-15

## 2023-11-15 RX ORDER — ATORVASTATIN CALCIUM 10 MG/1
10 TABLET, FILM COATED ORAL EVERY EVENING
Qty: 90 TABLET | Refills: 3 | Status: SHIPPED | OUTPATIENT
Start: 2023-11-15 | End: 2023-11-16 | Stop reason: DRUGHIGH

## 2023-11-15 RX ORDER — AMOXICILLIN AND CLAVULANATE POTASSIUM 875; 125 MG/1; MG/1
1 TABLET, FILM COATED ORAL 2 TIMES DAILY
Qty: 28 TABLET | Refills: 0 | Status: SHIPPED | OUTPATIENT
Start: 2023-11-15 | End: 2023-11-29

## 2023-11-15 RX ORDER — GABAPENTIN 300 MG/1
300 CAPSULE ORAL 2 TIMES DAILY
Qty: 180 CAPSULE | Refills: 1 | Status: SHIPPED | OUTPATIENT
Start: 2023-11-15

## 2023-11-15 NOTE — PROGRESS NOTES
"Chief Complaint  nipple pain (Left nipple 2 weeks sharp intermittent shooting pain. Does not go into chest wall ), Earache (Right ear 1 week ), and Med Refill    Subjective        Johnna Islas presents to [facility]  History of Present Illness  Larisa presents to the clinic with complaints of left nipple (in the 3 o'clock to 6 o'clock area) that is intermittent and has been going on for about 2 weeks. She describes the pain as a sharp pain when it hits and it stays in the nipple area and does not go to the chest wall. Denies any discharge, bleeding, or lumps. She had a mammogram completed in March and it was okay.     She also has complaints of right ear pain and fullness in her left ear. She has some nasal congestion, post nasal drip, and sinus pressure. Denies any chest pain, SOA, N/V/D, fever, chills, body aches. This has been going on for about a week and a half. She has been using some saline spray and saline gel.     She is also in need of lab work and medication refills.       Objective   Vital Signs:  /75   Pulse 75   Temp 98.2 °F (36.8 °C)   Resp 16   Ht 160 cm (62.99\")   Wt 82.1 kg (181 lb)   SpO2 94%   BMI 32.07 kg/m²   Estimated body mass index is 32.07 kg/m² as calculated from the following:    Height as of this encounter: 160 cm (62.99\").    Weight as of this encounter: 82.1 kg (181 lb).               Physical Exam  Constitutional:       Appearance: Normal appearance.   HENT:      Right Ear: A middle ear effusion is present. Tympanic membrane is bulging.   Cardiovascular:      Rate and Rhythm: Normal rate and regular rhythm.      Pulses: Normal pulses.      Heart sounds: Normal heart sounds.   Pulmonary:      Effort: Pulmonary effort is normal.      Breath sounds: Normal breath sounds.   Chest:       Skin:     General: Skin is warm and dry.   Neurological:      General: No focal deficit present.      Mental Status: She is alert and oriented to person, " place, and time.   Psychiatric:         Mood and Affect: Mood normal.         Behavior: Behavior normal.        Result Review :                   Assessment and Plan   Diagnoses and all orders for this visit:    1. Controlled type 2 diabetes mellitus with diabetic neuropathic arthropathy, without long-term current use of insulin (Primary)  Assessment & Plan:  Diabetes is improving with treatment.   Continue current treatment regimen.  Diabetes will be reassessed in 6 months.    Orders:  -     ORDER: Hemoglobin A1c; Future    2. Diabetic polyneuropathy associated with type 2 diabetes mellitus  -     gabapentin (NEURONTIN) 300 MG capsule; Take 1 capsule by mouth 2 (Two) Times a Day.  Dispense: 180 capsule; Refill: 1    3. Benign essential hypertension  Assessment & Plan:  Hypertension is worsening.  Medication changes per orders.  Blood pressure will be reassessed at the next regular appointment.    Orders:  -     lisinopril (PRINIVIL,ZESTRIL) 40 MG tablet; Take 1 tablet by mouth Daily.  Dispense: 30 tablet; Refill: 3    4. Hypertriglyceridemia  -     atorvastatin (LIPITOR) 10 MG tablet; Take 1 tablet by mouth Every Evening.  Dispense: 90 tablet; Refill: 3    5. Acute non-recurrent frontal sinusitis  -     amoxicillin-clavulanate (AUGMENTIN) 875-125 MG per tablet; Take 1 tablet by mouth 2 (Two) Times a Day for 14 days.  Dispense: 28 tablet; Refill: 0    6. Non-recurrent acute serous otitis media of right ear  -     amoxicillin-clavulanate (AUGMENTIN) 875-125 MG per tablet; Take 1 tablet by mouth 2 (Two) Times a Day for 14 days.  Dispense: 28 tablet; Refill: 0    7. Nipple pain  -     Mammo Diagnostic Digital Tomosynthesis Bilateral With CAD; Future  -     US Breast Left Limited; Future             Follow Up   Return in about 3 months (around 2/15/2024), or if symptoms worsen or fail to improve.  Patient was given instructions and counseling regarding her condition or for health maintenance advice. Please see specific  information pulled into the AVS if appropriate.         Answers submitted by the patient for this visit:  Other (Submitted on 11/14/2023)  Please describe your symptoms.: Breast pain, Medication refill, B/P check  Have you had these symptoms before?: No  How long have you been having these symptoms?: 1-2 weeks  Please list any medications you are currently taking for this condition.: None  Please describe any probable cause for these symptoms. : N/A  Primary Reason for Visit (Submitted on 11/14/2023)  What is the primary reason for your visit?: Other

## 2023-11-16 DIAGNOSIS — E11.9 CONTROLLED TYPE 2 DIABETES MELLITUS WITHOUT COMPLICATION, WITHOUT LONG-TERM CURRENT USE OF INSULIN: Primary | ICD-10-CM

## 2023-11-16 DIAGNOSIS — E78.1 HYPERTRIGLYCERIDEMIA: ICD-10-CM

## 2023-11-16 RX ORDER — ATORVASTATIN CALCIUM 20 MG/1
20 TABLET, FILM COATED ORAL NIGHTLY
Qty: 90 TABLET | Refills: 3 | Status: SHIPPED | OUTPATIENT
Start: 2023-11-16

## 2023-11-27 ENCOUNTER — HOSPITAL ENCOUNTER (EMERGENCY)
Facility: HOSPITAL | Age: 58
Discharge: HOME | End: 2023-11-27
Attending: EMERGENCY MEDICINE | Admitting: EMERGENCY MEDICINE
Payer: COMMERCIAL

## 2023-11-27 ENCOUNTER — APPOINTMENT (OUTPATIENT)
Dept: GENERAL RADIOLOGY | Facility: HOSPITAL | Age: 58
End: 2023-11-27
Payer: COMMERCIAL

## 2023-11-27 VITALS
SYSTOLIC BLOOD PRESSURE: 167 MMHG | DIASTOLIC BLOOD PRESSURE: 85 MMHG | HEIGHT: 63 IN | WEIGHT: 181 LBS | RESPIRATION RATE: 18 BRPM | OXYGEN SATURATION: 94 % | HEART RATE: 81 BPM | TEMPERATURE: 98.1 F | BODY MASS INDEX: 32.07 KG/M2

## 2023-11-27 DIAGNOSIS — I15.9 SECONDARY HYPERTENSION: Primary | ICD-10-CM

## 2023-11-27 LAB
ALBUMIN SERPL-MCNC: 4.5 G/DL (ref 3.5–5.2)
ALBUMIN/GLOB SERPL: 1.7 G/DL
ALP SERPL-CCNC: 45 U/L (ref 39–117)
ALT SERPL W P-5'-P-CCNC: 24 U/L (ref 1–33)
ANION GAP SERPL CALCULATED.3IONS-SCNC: 13.4 MMOL/L (ref 5–15)
AST SERPL-CCNC: 21 U/L (ref 1–32)
BASOPHILS # BLD AUTO: 0.08 10*3/MM3 (ref 0–0.2)
BASOPHILS NFR BLD AUTO: 1 % (ref 0–1.5)
BILIRUB SERPL-MCNC: 0.3 MG/DL (ref 0–1.2)
BUN SERPL-MCNC: 11 MG/DL (ref 6–20)
BUN/CREAT SERPL: 18.6 (ref 7–25)
CALCIUM SPEC-SCNC: 10.3 MG/DL (ref 8.6–10.5)
CHLORIDE SERPL-SCNC: 96 MMOL/L (ref 98–107)
CO2 SERPL-SCNC: 22.6 MMOL/L (ref 22–29)
CREAT SERPL-MCNC: 0.59 MG/DL (ref 0.57–1)
DEPRECATED RDW RBC AUTO: 39.9 FL (ref 37–54)
EGFRCR SERPLBLD CKD-EPI 2021: 105.3 ML/MIN/1.73
EOSINOPHIL # BLD AUTO: 0.39 10*3/MM3 (ref 0–0.4)
EOSINOPHIL NFR BLD AUTO: 4.7 % (ref 0.3–6.2)
ERYTHROCYTE [DISTWIDTH] IN BLOOD BY AUTOMATED COUNT: 12.8 % (ref 12.3–15.4)
GLOBULIN UR ELPH-MCNC: 2.6 GM/DL
GLUCOSE SERPL-MCNC: 122 MG/DL (ref 65–99)
HCT VFR BLD AUTO: 40.7 % (ref 34–46.6)
HGB BLD-MCNC: 13.8 G/DL (ref 12–15.9)
HOLD SPECIMEN: NORMAL
HOLD SPECIMEN: NORMAL
IMM GRANULOCYTES # BLD AUTO: 0.02 10*3/MM3 (ref 0–0.05)
IMM GRANULOCYTES NFR BLD AUTO: 0.2 % (ref 0–0.5)
LIPASE SERPL-CCNC: 27 U/L (ref 13–60)
LYMPHOCYTES # BLD AUTO: 2.15 10*3/MM3 (ref 0.7–3.1)
LYMPHOCYTES NFR BLD AUTO: 25.7 % (ref 19.6–45.3)
MAGNESIUM SERPL-MCNC: 1.9 MG/DL (ref 1.6–2.6)
MCH RBC QN AUTO: 29.2 PG (ref 26.6–33)
MCHC RBC AUTO-ENTMCNC: 33.9 G/DL (ref 31.5–35.7)
MCV RBC AUTO: 86 FL (ref 79–97)
MONOCYTES # BLD AUTO: 0.38 10*3/MM3 (ref 0.1–0.9)
MONOCYTES NFR BLD AUTO: 4.5 % (ref 5–12)
NEUTROPHILS NFR BLD AUTO: 5.34 10*3/MM3 (ref 1.7–7)
NEUTROPHILS NFR BLD AUTO: 63.9 % (ref 42.7–76)
NRBC BLD AUTO-RTO: 0 /100 WBC (ref 0–0.2)
NT-PROBNP SERPL-MCNC: <36 PG/ML (ref 0–900)
PLATELET # BLD AUTO: 315 10*3/MM3 (ref 140–450)
PMV BLD AUTO: 9.2 FL (ref 6–12)
POTASSIUM SERPL-SCNC: 4.2 MMOL/L (ref 3.5–5.2)
PROT SERPL-MCNC: 7.1 G/DL (ref 6–8.5)
RBC # BLD AUTO: 4.73 10*6/MM3 (ref 3.77–5.28)
SODIUM SERPL-SCNC: 132 MMOL/L (ref 136–145)
TROPONIN T SERPL HS-MCNC: <6 NG/L
WBC NRBC COR # BLD AUTO: 8.36 10*3/MM3 (ref 3.4–10.8)
WHOLE BLOOD HOLD COAG: NORMAL
WHOLE BLOOD HOLD SPECIMEN: NORMAL

## 2023-11-27 PROCEDURE — 71045 X-RAY EXAM CHEST 1 VIEW: CPT

## 2023-11-27 PROCEDURE — 93005 ELECTROCARDIOGRAM TRACING: CPT

## 2023-11-27 PROCEDURE — 93005 ELECTROCARDIOGRAM TRACING: CPT | Performed by: EMERGENCY MEDICINE

## 2023-11-27 PROCEDURE — 36415 COLL VENOUS BLD VENIPUNCTURE: CPT

## 2023-11-27 PROCEDURE — 83690 ASSAY OF LIPASE: CPT

## 2023-11-27 PROCEDURE — 80053 COMPREHEN METABOLIC PANEL: CPT

## 2023-11-27 PROCEDURE — 84484 ASSAY OF TROPONIN QUANT: CPT

## 2023-11-27 PROCEDURE — 85025 COMPLETE CBC W/AUTO DIFF WBC: CPT

## 2023-11-27 PROCEDURE — 83880 ASSAY OF NATRIURETIC PEPTIDE: CPT

## 2023-11-27 PROCEDURE — 99284 EMERGENCY DEPT VISIT MOD MDM: CPT

## 2023-11-27 PROCEDURE — 83735 ASSAY OF MAGNESIUM: CPT

## 2023-11-27 RX ORDER — SODIUM CHLORIDE 0.9 % (FLUSH) 0.9 %
10 SYRINGE (ML) INJECTION AS NEEDED
Status: DISCONTINUED | OUTPATIENT
Start: 2023-11-27 | End: 2023-11-27 | Stop reason: HOSPADM

## 2023-11-27 RX ORDER — ASPIRIN 81 MG/1
324 TABLET, CHEWABLE ORAL ONCE
Status: DISCONTINUED | OUTPATIENT
Start: 2023-11-27 | End: 2023-11-27 | Stop reason: HOSPADM

## 2023-11-27 NOTE — ED PROVIDER NOTES
Time: 3:14 PM EST  Date of encounter:  2023  Independent Historian/Clinical History and Information was obtained by:   Patient    History is limited by: N/A    Chief Complaint: Palpitations      History of Present Illness:  Patient is a 58 y.o. year old female who presents to the emergency department for evaluation of palpitations that started last night.  Patient denies shortness of breath.  Patient has no cough hemoptysis.  Patient does report some chest pain that is been nonradiating.  Patient has no leg pain or swelling.  Patient has no fever or chills.  Patient reports that her blood pressure has been elevated.  She states that she recently had her lisinopril increased.    HPI    Patient Care Team  Primary Care Provider: Robson Jasso DO    Past Medical History:     Allergies   Allergen Reactions    Fenofibrate Micronized Other (See Comments)     Elevated liver enzymes    Oxycodone-Acetaminophen Itching    Measles Mumps And Rubella Virus Vaccine Live Other (See Comments)     Mump like symptoms     Past Medical History:   Diagnosis Date    Bell's palsy     Diabetes mellitus     Hyperlipidemia     Hypertension      Past Surgical History:   Procedure Laterality Date    ADENOIDECTOMY      APPENDECTOMY       SECTION      CHOLECYSTECTOMY      HYSTERECTOMY      TONSILLECTOMY       Family History   Problem Relation Age of Onset    Hypertension Mother     Thyroid disease Mother     Atrial fibrillation Mother     Hyperlipidemia Father     Stroke Father     Diabetes Father     Heart disease Father        Home Medications:  Prior to Admission medications    Medication Sig Start Date End Date Taking? Authorizing Provider   acetaminophen (TYLENOL) 325 MG tablet Take 1 tablet by mouth Every 4 (Four) Hours As Needed.    Emergency, Nurse Epic, RN   ACIDOPHILUS LACTOBACILLUS PO Take  by mouth.    Emergency, Nurse Yusuf RN   albuterol sulfate  (90 Base) MCG/ACT inhaler  23   Provider, MD Raymundo    amoxicillin-clavulanate (AUGMENTIN) 875-125 MG per tablet Take 1 tablet by mouth 2 (Two) Times a Day for 14 days. 11/15/23 11/29/23  Sarah Beth Falcon APRN   aspirin 81 MG EC tablet Take 1 tablet by mouth Daily. 5/3/23   Robson Jasso DO   atorvastatin (Lipitor) 20 MG tablet Take 1 tablet by mouth Every Night. 11/16/23   Robson Jasso DO   CETIRIZINE HCL PO Take  by mouth.    Emergency, Nurse NIVIA Goldberg   Cholecalciferol (VITAMIN D-3 PO) Take  by mouth.    Emergency, Nurse Epic, RN   ESTRADIOL PO Take 5 mcg by mouth. QD    Emergency, Nurse Epic, RN   gabapentin (NEURONTIN) 300 MG capsule Take 1 capsule by mouth 2 (Two) Times a Day. 11/15/23   Sarah Beth Falcon APRN   lisinopril (PRINIVIL,ZESTRIL) 40 MG tablet Take 1 tablet by mouth Daily. 11/15/23   Sarah Beth Falcon APRN   metFORMIN ER (GLUCOPHAGE-XR) 500 MG 24 hr tablet Take 2 tablets by mouth 2 (Two) Times a Day With Meals. 9/25/23   Robson Jasso DO   metoprolol tartrate (LOPRESSOR) 25 MG tablet Take 1 tablet by mouth 2 (Two) Times a Day. 11/27/23   Kalyn Lim MD   multivitamin with minerals tablet tablet Take 1 tablet by mouth Daily.    Provider, MD Raymundo   Omega-3 Fatty Acids (FISH OIL OMEGA-3 PO) Take  by mouth.    Emergency, Nurse NIVIA Goldberg        Social History:   Social History     Tobacco Use    Smoking status: Never     Passive exposure: Never    Smokeless tobacco: Never   Vaping Use    Vaping Use: Never used   Substance Use Topics    Alcohol use: Not Currently    Drug use: Never         Review of Systems:  Review of Systems   Constitutional:  Negative for chills and fever.   HENT:  Negative for congestion, rhinorrhea and sore throat.    Eyes:  Negative for pain and visual disturbance.   Respiratory:  Negative for apnea, cough, chest tightness and shortness of breath.    Cardiovascular:  Positive for palpitations. Negative for chest pain.   Gastrointestinal:  Negative for abdominal pain, diarrhea, nausea and vomiting.   Genitourinary:  " Negative for difficulty urinating and dysuria.   Musculoskeletal:  Negative for joint swelling and myalgias.   Skin:  Negative for color change.   Neurological:  Negative for seizures and headaches.   Psychiatric/Behavioral: Negative.     All other systems reviewed and are negative.       Physical Exam:  /85   Pulse 81   Temp 98.1 °F (36.7 °C) (Oral)   Resp 18   Ht 160 cm (62.99\")   Wt 82.1 kg (181 lb)   SpO2 94%   BMI 32.07 kg/m²     Physical Exam  Vitals and nursing note reviewed.   Constitutional:       General: She is not in acute distress.     Appearance: Normal appearance. She is not toxic-appearing.   HENT:      Head: Normocephalic and atraumatic.      Jaw: There is normal jaw occlusion.   Eyes:      General: Lids are normal.      Extraocular Movements: Extraocular movements intact.      Conjunctiva/sclera: Conjunctivae normal.      Pupils: Pupils are equal, round, and reactive to light.   Cardiovascular:      Rate and Rhythm: Normal rate and regular rhythm.      Pulses: Normal pulses.      Heart sounds: Normal heart sounds.   Pulmonary:      Effort: Pulmonary effort is normal. No respiratory distress.      Breath sounds: Normal breath sounds. No wheezing or rhonchi.   Abdominal:      General: Abdomen is flat.      Palpations: Abdomen is soft.      Tenderness: There is no abdominal tenderness. There is no guarding or rebound.   Musculoskeletal:         General: Normal range of motion.      Cervical back: Normal range of motion and neck supple.      Right lower leg: No edema.      Left lower leg: No edema.   Skin:     General: Skin is warm and dry.   Neurological:      Mental Status: She is alert and oriented to person, place, and time. Mental status is at baseline.   Psychiatric:         Mood and Affect: Mood normal.                  Procedures:  Procedures      Medical Decision Making:      Comorbidities that affect care:    Diabetes, Hypertension    External Notes reviewed:    Previous Clinic " Note: Patient was last seen in clinic for nipple pain.      The following orders were placed and all results were independently analyzed by me:  Orders Placed This Encounter   Procedures    XR Chest 1 View    Funk Draw    High Sensitivity Troponin T    Comprehensive Metabolic Panel    Lipase    BNP    Magnesium    CBC Auto Differential    High Sensitivity Troponin T 2Hr    NPO Diet NPO Type: Strict NPO    Undress & Gown    Continuous Pulse Oximetry    Oxygen Therapy- Nasal Cannula; Titrate 1-6 LPM Per SpO2; 90 - 95%    ECG 12 Lead ED Triage Standing Order; Chest Pain    ECG 12 Lead ED Triage Standing Order; Chest Pain    Insert Peripheral IV    CBC & Differential    Green Top (Gel)    Lavender Top    Gold Top - SST    Light Blue Top       Medications Given in the Emergency Department:  Medications   sodium chloride 0.9 % flush 10 mL (has no administration in time range)   aspirin chewable tablet 324 mg (324 mg Oral Not Given 11/27/23 1227)        ED Course:    ED Course as of 11/27/23 1517   Mon Nov 27, 2023   1514 EKG:    Rhythm: sinus  Rate: 75  Axis: normal  Intervals: normal  ST Segment: no elevations      Interpreted by me   [BN]      ED Course User Index  [BN] Kalyn Lim MD       Labs:    Lab Results (last 24 hours)       Procedure Component Value Units Date/Time    High Sensitivity Troponin T [[id number]]  (Normal) Collected: 11/27/23 1139    Specimen: Blood Updated: 11/27/23 1219     HS Troponin T <6 ng/L     Narrative:      High Sensitive Troponin T Reference Range:  <14.0 ng/L- Negative Female for AMI  <22.0 ng/L- Negative Male for AMI  >=14 - Abnormal Female indicating possible myocardial injury.  >=22 - Abnormal Male indicating possible myocardial injury.   Clinicians would have to utilize clinical acumen, EKG, Troponin, and serial changes to determine if it is an Acute Myocardial Infarction or myocardial injury due to an underlying chronic condition.         CBC & Differential [[id number]]   (Abnormal) Collected: 11/27/23 1139    Specimen: Blood Updated: 11/27/23 1156    Narrative:      The following orders were created for panel order CBC & Differential.  Procedure                               Abnormality         Status                     ---------                               -----------         ------                     CBC Auto Differential[[id number]]        Abnormal            Final result                 Please view results for these tests on the individual orders.    Comprehensive Metabolic Panel [[id number]]  (Abnormal) Collected: 11/27/23 1139    Specimen: Blood Updated: 11/27/23 1219     Glucose 122 mg/dL      BUN 11 mg/dL      Creatinine 0.59 mg/dL      Sodium 132 mmol/L      Potassium 4.2 mmol/L      Chloride 96 mmol/L      CO2 22.6 mmol/L      Calcium 10.3 mg/dL      Total Protein 7.1 g/dL      Albumin 4.5 g/dL      ALT (SGPT) 24 U/L      AST (SGOT) 21 U/L      Alkaline Phosphatase 45 U/L      Total Bilirubin 0.3 mg/dL      Globulin 2.6 gm/dL      A/G Ratio 1.7 g/dL      BUN/Creatinine Ratio 18.6     Anion Gap 13.4 mmol/L      eGFR 105.3 mL/min/1.73     Narrative:      GFR Normal >60  Chronic Kidney Disease <60  Kidney Failure <15      Lipase [[id number]]  (Normal) Collected: 11/27/23 1139    Specimen: Blood Updated: 11/27/23 1219     Lipase 27 U/L     BNP [[id number]]  (Normal) Collected: 11/27/23 1139    Specimen: Blood Updated: 11/27/23 1214     proBNP <36.0 pg/mL     Narrative:      This assay is used as an aid in the diagnosis of individuals suspected of having heart failure. It can be used as an aid in the diagnosis of acute decompensated heart failure (ADHF) in patients presenting with signs and symptoms of ADHF to the emergency department (ED). In addition, NT-proBNP of <300 pg/mL indicates ADHF is not likely.    Age Range Result Interpretation  NT-proBNP Concentration (pg/mL:      <50             Positive            >450                   Gray                 300-450                     Negative             <300    50-75           Positive            >900                  Gray                300-900                  Negative            <300      >75             Positive            >1800                  Gray                300-1800                  Negative            <300    Magnesium [[id number]]  (Normal) Collected: 11/27/23 1139    Specimen: Blood Updated: 11/27/23 1219     Magnesium 1.9 mg/dL     CBC Auto Differential [[id number]]  (Abnormal) Collected: 11/27/23 1139    Specimen: Blood Updated: 11/27/23 1156     WBC 8.36 10*3/mm3      RBC 4.73 10*6/mm3      Hemoglobin 13.8 g/dL      Hematocrit 40.7 %      MCV 86.0 fL      MCH 29.2 pg      MCHC 33.9 g/dL      RDW 12.8 %      RDW-SD 39.9 fl      MPV 9.2 fL      Platelets 315 10*3/mm3      Neutrophil % 63.9 %      Lymphocyte % 25.7 %      Monocyte % 4.5 %      Eosinophil % 4.7 %      Basophil % 1.0 %      Immature Grans % 0.2 %      Neutrophils, Absolute 5.34 10*3/mm3      Lymphocytes, Absolute 2.15 10*3/mm3      Monocytes, Absolute 0.38 10*3/mm3      Eosinophils, Absolute 0.39 10*3/mm3      Basophils, Absolute 0.08 10*3/mm3      Immature Grans, Absolute 0.02 10*3/mm3      nRBC 0.0 /100 WBC              Imaging:    XR Chest 1 View    Result Date: 11/27/2023  PROCEDURE: XR CHEST 1 VW  COMPARISON: None  INDICATIONS: Chest Pain Triage Protocol  FINDINGS:  Negative for infiltrate, edema, large effusion or pneumothorax.  Heart size normal.  Osseous structures grossly unremarkable.        No active pulmonary process.       HERMANN CALDERON MD       Electronically Signed and Approved By: HERMANN CALDERON MD on 11/27/2023 at 12:10                Differential Diagnosis and Discussion:    Palpitations: Differential diagnosis includes but is not limited to anxiety, atrioventricular blocks, mitral valve disease, hypoxia, coronary artery disease, hypokalemia, anemia, fever, COPD, congestive heart failure, pericarditis, José-Parkinson-White syndrome,  pulmonary embolism, SVT, atrial fibrillation, atrial flutter, sinus tachycardia, thyrotoxicosis, and pheochromocytoma.    All labs were reviewed and interpreted by me.  All X-rays impressions were independently interpreted by me.  EKG was interpreted by me.    MDM     Amount and/or Complexity of Data Reviewed  Clinical lab tests: reviewed  Tests in the radiology section of CPT®: reviewed  Tests in the medicine section of CPT®: reviewed       The patient presents to the ED with hypertension.  The patient´s CBC that was reviewed and interpreted by me shows no abnormalities of critical concern. Of note, there is no anemia requiring a blood transfusion and the platelet count is acceptable.  The patient´s CMP that was reviewed and interpretted by me shows no abnormalities of critical concern. Of note, the patient´s sodium and potassium are acceptable. The patient´s liver enzymes are unremarkable. The patient´s renal function (creatinine) is preserved. The patient has a normal anion gap.  Troponin is negative.  Patient did have PVCs on the monitor.  The patient was placed on a monitor in the ED. The blood pressure is much improved with ED treatment. The patient denies chest pain. The patient has a normal neurological exam and has mental status at baseline. Upon re-examination, the patient has no signs or symptoms of acute end organ damage.  The patient was advised of the importance of medical compliance with an emphasis in awareness of their daily sodium intake. The patient was counseled that elevated blood pressure is detrimental to their heart, eyes, kidneys, and may lead to a stroke. The patient was advised to check their blood pressure regularly and follow up as an outpatient regarding this matter. The patient was counseled to return to the ED for sudden or severe headache, numbness or tingling on one side of the body, facial droop, difficulty speaking, chest pain, or shortness of breath. The patient's condition is  stable and appropriate for discharge. The patient will pursue further outpatient evaluation with the primary care physician or other designated or consulting physician as indicated in the discharge instructions.          Patient Care Considerations:    PERC: I used the PERC score to risk stratify the patient for PE and a CT of the chest was considered but ultimately not indicated in today's visit.      Consultants/Shared Management Plan:    None    Social Determinants of Health:    Patient is independent, reliable, and has access to care.       Disposition and Care Coordination:    Discharged: I considered escalation of care by admitting this patient for observation, however the patient has improved and is suitable and  stable for discharge.    I have explained the patient´s condition, diagnoses and treatment plan based on the information available to me at this time. I have answered questions and addressed any concerns. The patient has a good  understanding of the patient´s diagnosis, condition, and treatment plan as can be expected at this point. The vital signs have been stable. The patient´s condition is stable and appropriate for discharge from the emergency department.      The patient will pursue further outpatient evaluation with the primary care physician or other designated or consulting physician as outlined in the discharge instructions. They are agreeable to this plan of care and follow-up instructions have been explained in detail. The patient has received these instructions in written format and have expressed an understanding of the discharge instructions. The patient is aware that any significant change in condition or worsening of symptoms should prompt an immediate return to this or the closest emergency department or call to 911.  I have explained discharge medications and the need for follow up with the patient/caretakers. This was also printed in the discharge instructions. Patient was  discharged with the following medications and follow up:      Medication List        New Prescriptions      metoprolol tartrate 25 MG tablet  Commonly known as: LOPRESSOR  Take 1 tablet by mouth 2 (Two) Times a Day.               Where to Get Your Medications        These medications were sent to [facility] [id number] - [place], [place] - [address] AT [facility] [address] - [phone]  - [phone] FX  [address] [place] [address] [place] [address]      Phone: [phone]   metoprolol tartrate 25 MG tablet      Robson Jasso, DO  [address]  [facility] [address]  [place] [id number] [phone]          Moris Busch MD  [address]  [place] [address]  [phone]    In 2 days         Final diagnoses:   Secondary hypertension        ED Disposition       ED Disposition   Discharge    Condition   Stable    Comment   --               This medical record created using voice recognition software.             Kalyn Lim MD  11/27/23 3969

## 2023-11-29 ENCOUNTER — OFFICE VISIT (OUTPATIENT)
Dept: FAMILY MEDICINE CLINIC | Facility: CLINIC | Age: 58
End: 2023-11-29
Payer: COMMERCIAL

## 2023-11-29 VITALS
RESPIRATION RATE: 16 BRPM | HEART RATE: 76 BPM | TEMPERATURE: 98.7 F | BODY MASS INDEX: 33.64 KG/M2 | WEIGHT: 182.8 LBS | OXYGEN SATURATION: 98 % | HEIGHT: 62 IN | DIASTOLIC BLOOD PRESSURE: 90 MMHG | SYSTOLIC BLOOD PRESSURE: 148 MMHG

## 2023-11-29 DIAGNOSIS — I10 BENIGN ESSENTIAL HYPERTENSION: ICD-10-CM

## 2023-11-29 DIAGNOSIS — R00.2 PALPITATIONS: Primary | ICD-10-CM

## 2023-11-29 LAB
QT INTERVAL: 360 MS
QT INTERVAL: 375 MS
QTC INTERVAL: 418 MS
QTC INTERVAL: 422 MS

## 2023-11-29 PROCEDURE — 99213 OFFICE O/P EST LOW 20 MIN: CPT | Performed by: FAMILY MEDICINE

## 2023-11-29 NOTE — PROGRESS NOTES
"Chief Complaint  Follow-up and Palpitations    Subjective        Johnna Islas presents to [facility]  History of Present Illness    Presents for follow-up from hospital having palpitations improved on metoprolol    Has follow-up with cardiology upcoming    Objective   Vital Signs:  /90   Pulse 76   Temp 98.7 °F (37.1 °C)   Resp 16   Ht 157.5 cm (62\")   Wt 82.9 kg (182 lb 12.8 oz)   SpO2 98%   BMI 33.43 kg/m²   Estimated body mass index is 33.43 kg/m² as calculated from the following:    Height as of this encounter: 157.5 cm (62\").    Weight as of this encounter: 82.9 kg (182 lb 12.8 oz).               Physical Exam  Vitals reviewed.   Constitutional:       Appearance: Normal appearance. She is well-developed.   HENT:      Head: Normocephalic and atraumatic.      Right Ear: External ear normal.      Left Ear: External ear normal.      Nose: Nose normal.   Eyes:      Conjunctiva/sclera: Conjunctivae normal.      Pupils: Pupils are equal, round, and reactive to light.   Cardiovascular:      Rate and Rhythm: Normal rate.   Pulmonary:      Effort: Pulmonary effort is normal.      Breath sounds: Normal breath sounds.   Abdominal:      General: There is no distension.   Skin:     General: Skin is warm and dry.   Neurological:      Mental Status: She is alert and oriented to person, place, and time. Mental status is at baseline.   Psychiatric:         Mood and Affect: Mood and affect normal.         Behavior: Behavior normal.         Thought Content: Thought content normal.         Judgment: Judgment normal.      Result Review :  The following data was reviewed by: Robson Jasso DO on 11/29/2023:  Common labs          5/3/2023    09:52 5/3/2023    10:08 11/15/2023    11:49 11/27/2023    11:39   Common Labs   Glucose 113   101  122    BUN 11   10  11    Creatinine 0.47   0.66  0.59    Sodium 136   134  132    Potassium 4.3   4.6  4.2    Chloride 100   97  96    Calcium 10.2  "  10.1  10.3    Albumin 4.4   4.5  4.5    Total Bilirubin 0.3   0.4  0.3    Alkaline Phosphatase 42   42  45    AST (SGOT) 20   22  21    ALT (SGPT) 21   25  24    WBC 6.81    8.36    Hemoglobin 12.9    13.8    Hematocrit 37.4    40.7    Platelets 270    315    Total Cholesterol 151   165     Triglycerides 393   431     HDL Cholesterol 25   28     LDL Cholesterol  65   69     Hemoglobin A1C 6.00   6.00     Microalbumin, Urine  <1.2                     Assessment and Plan   Diagnoses and all orders for this visit:    1. Palpitations (Primary)    2. Benign essential hypertension      Monitor blood pressure at home continue metoprolol consider increasing as needed if continues to have symptoms improved overall follow-up with cardiology       Follow Up   Return in about 2 weeks (around 12/13/2023), or if symptoms worsen or fail to improve, for Recheck.  Patient was given instructions and counseling regarding her condition or for health maintenance advice. Please see specific information pulled into the AVS if appropriate.

## 2023-11-30 ENCOUNTER — HOSPITAL ENCOUNTER (OUTPATIENT)
Dept: ULTRASOUND IMAGING | Facility: HOSPITAL | Age: 58
Discharge: HOME | End: 2023-11-30
Payer: COMMERCIAL

## 2023-11-30 ENCOUNTER — HOSPITAL ENCOUNTER (OUTPATIENT)
Dept: MAMMOGRAPHY | Facility: HOSPITAL | Age: 58
Discharge: HOME | End: 2023-11-30
Payer: COMMERCIAL

## 2023-11-30 DIAGNOSIS — N64.4 NIPPLE PAIN: ICD-10-CM

## 2023-11-30 PROCEDURE — G0279 TOMOSYNTHESIS, MAMMO: HCPCS

## 2023-11-30 PROCEDURE — 77066 DX MAMMO INCL CAD BI: CPT

## 2023-11-30 PROCEDURE — 76642 ULTRASOUND BREAST LIMITED: CPT

## 2023-12-11 ENCOUNTER — PATIENT OUTREACH (OUTPATIENT)
Dept: ONCOLOGY | Facility: HOSPITAL | Age: 58
End: 2023-12-11
Payer: COMMERCIAL

## 2023-12-11 ENCOUNTER — HOSPITAL ENCOUNTER (OUTPATIENT)
Dept: MAMMOGRAPHY | Facility: HOSPITAL | Age: 58
Discharge: HOME | End: 2023-12-11
Payer: COMMERCIAL

## 2023-12-11 DIAGNOSIS — N64.89 BREAST ASYMMETRY: ICD-10-CM

## 2023-12-11 PROCEDURE — 25010000002 LIDOCAINE 1 % SOLUTION

## 2023-12-11 PROCEDURE — 88305 TISSUE EXAM BY PATHOLOGIST: CPT

## 2023-12-11 PROCEDURE — A4648 IMPLANTABLE TISSUE MARKER: HCPCS

## 2023-12-11 RX ORDER — LIDOCAINE HYDROCHLORIDE AND EPINEPHRINE 10; 10 MG/ML; UG/ML
10 INJECTION, SOLUTION INFILTRATION; PERINEURAL ONCE
Status: COMPLETED | OUTPATIENT
Start: 2023-12-11 | End: 2023-12-11

## 2023-12-11 RX ORDER — LIDOCAINE HYDROCHLORIDE 10 MG/ML
20 INJECTION, SOLUTION INFILTRATION; PERINEURAL ONCE
Status: COMPLETED | OUTPATIENT
Start: 2023-12-11 | End: 2023-12-11

## 2023-12-11 RX ADMIN — LIDOCAINE HYDROCHLORIDE,EPINEPHRINE BITARTRATE 10 ML: 10; .01 INJECTION, SOLUTION INFILTRATION; PERINEURAL at 08:46

## 2023-12-11 RX ADMIN — LIDOCAINE HYDROCHLORIDE 20 ML: 10 INJECTION, SOLUTION INFILTRATION; PERINEURAL at 08:46

## 2023-12-12 LAB
CYTO UR: NORMAL
LAB AP CASE REPORT: NORMAL
LAB AP CLINICAL INFORMATION: NORMAL
PATH REPORT.FINAL DX SPEC: NORMAL
PATH REPORT.GROSS SPEC: NORMAL

## 2023-12-13 DIAGNOSIS — N64.89 PSEUDOANGIOMATOUS STROMAL HYPERPLASIA OF BREAST: Primary | ICD-10-CM

## 2023-12-13 NOTE — PROGRESS NOTES
Final pathological findings show PASH. Which is a benign lesion ( non cancerous). They do suggest surgical referral for excision given the size.Referral placed

## 2023-12-26 ENCOUNTER — OFFICE VISIT (OUTPATIENT)
Dept: FAMILY MEDICINE CLINIC | Facility: CLINIC | Age: 58
End: 2023-12-26
Payer: COMMERCIAL

## 2023-12-26 VITALS
DIASTOLIC BLOOD PRESSURE: 80 MMHG | WEIGHT: 182.1 LBS | OXYGEN SATURATION: 97 % | SYSTOLIC BLOOD PRESSURE: 138 MMHG | TEMPERATURE: 98.7 F | BODY MASS INDEX: 33.31 KG/M2 | HEART RATE: 73 BPM

## 2023-12-26 DIAGNOSIS — I10 BENIGN ESSENTIAL HYPERTENSION: Chronic | ICD-10-CM

## 2023-12-26 DIAGNOSIS — R00.2 PALPITATIONS: Primary | ICD-10-CM

## 2023-12-26 PROCEDURE — 99213 OFFICE O/P EST LOW 20 MIN: CPT | Performed by: FAMILY MEDICINE

## 2023-12-26 NOTE — PROGRESS NOTES
"Chief Complaint  Follow-up (Palpatations)    Subjective        Johnna Islas presents to [facility]  History of Present Illness    Patient taking metoprolol doing well does make sleepy at times otherwise no issues blood pressure well-controlled    Recently had breast biopsy has been advised to follow-up with surgery would like to follow-up with NP who ordered for further discussion if indicated discussed seeing surgery and different specialist    Objective   Vital Signs:  /80   Pulse 73   Temp 98.7 °F (37.1 °C)   Wt 82.6 kg (182 lb 1.6 oz)   SpO2 97%   BMI 33.31 kg/m²   Estimated body mass index is 33.31 kg/m² as calculated from the following:    Height as of 11/29/23: 157.5 cm (62\").    Weight as of this encounter: 82.6 kg (182 lb 1.6 oz).               Physical Exam  Vitals reviewed.   Constitutional:       Appearance: Normal appearance. She is well-developed.   HENT:      Head: Normocephalic and atraumatic.      Right Ear: External ear normal.      Left Ear: External ear normal.      Nose: Nose normal.   Eyes:      Conjunctiva/sclera: Conjunctivae normal.      Pupils: Pupils are equal, round, and reactive to light.   Cardiovascular:      Rate and Rhythm: Normal rate.   Pulmonary:      Effort: Pulmonary effort is normal.      Breath sounds: Normal breath sounds.   Abdominal:      General: There is no distension.   Skin:     General: Skin is warm and dry.   Neurological:      Mental Status: She is alert and oriented to person, place, and time. Mental status is at baseline.   Psychiatric:         Mood and Affect: Mood and affect normal.         Behavior: Behavior normal.         Thought Content: Thought content normal.         Judgment: Judgment normal.        Result Review :  The following data was reviewed by: Robson Jasso DO on 12/26/2023:  Common labs          5/3/2023    09:52 5/3/2023    10:08 11/15/2023    11:49 11/27/2023    11:39   Common Labs   Glucose 113   " 101  122    BUN 11   10  11    Creatinine 0.47   0.66  0.59    Sodium 136   134  132    Potassium 4.3   4.6  4.2    Chloride 100   97  96    Calcium 10.2   10.1  10.3    Albumin 4.4   4.5  4.5    Total Bilirubin 0.3   0.4  0.3    Alkaline Phosphatase 42   42  45    AST (SGOT) 20   22  21    ALT (SGPT) 21   25  24    WBC 6.81    8.36    Hemoglobin 12.9    13.8    Hematocrit 37.4    40.7    Platelets 270    315    Total Cholesterol 151   165     Triglycerides 393   431     HDL Cholesterol 25   28     LDL Cholesterol  65   69     Hemoglobin A1C 6.00   6.00     Microalbumin, Urine  <1.2                     Assessment and Plan   Diagnoses and all orders for this visit:    1. Palpitations (Primary)    2. Benign essential hypertension      Continue medicines as prescribed discussed monitoring blood pressure at home heart rate controlled at goal less than 140/90 BP follow-up with specialist surgery for breast concerns and NP for follow-up discussion on findings if indicated         Follow Up   No follow-ups on file.  Patient was given instructions and counseling regarding her condition or for health maintenance advice. Please see specific information pulled into the AVS if appropriate.

## 2024-01-19 ENCOUNTER — TELEPHONE (OUTPATIENT)
Dept: SURGERY | Facility: CLINIC | Age: 59
End: 2024-01-19
Payer: COMMERCIAL

## 2024-01-19 NOTE — TELEPHONE ENCOUNTER
New patient chart prepared for Dr Angela Corona to review. New [id number] referral for [facility]. Pt requesting Dr Corona.     Chart to Sekou DOE

## 2024-01-22 ENCOUNTER — TELEPHONE (OUTPATIENT)
Dept: SURGERY | Facility: CLINIC | Age: 59
End: 2024-01-22
Payer: COMMERCIAL

## 2024-01-22 NOTE — TELEPHONE ENCOUNTER
Jackm for pt to call back to [facility] new pt appt with brian gomez for PAS

## 2024-01-23 ENCOUNTER — TELEPHONE (OUTPATIENT)
Dept: SURGERY | Facility: CLINIC | Age: 59
End: 2024-01-23
Payer: COMMERCIAL

## 2024-01-23 PROBLEM — R00.2 PALPITATIONS: Status: ACTIVE | Noted: 2024-01-23

## 2024-01-23 PROBLEM — R07.89 CHEST PAIN, ATYPICAL: Status: ACTIVE | Noted: 2024-01-23

## 2024-01-23 NOTE — PROGRESS NOTES
Chief Complaint  Palpitations, Chest Pain, and Hypertension      History of Present Illness  Johnna Islas presents to [facility] CARDIOLOGY  Patient is a 58-year-old with a previous history of hypertension dyslipidemia who started having episodes and problems with heart palpitations as well as some substernal chest discomfort in November.  The patient was seen in the emergency room at that time EKG and enzymes were unremarkable but she was found to have elevation of blood pressure with systolic sometimes getting up in the 200 range.  She also reports some mild issues with lower extremity edema.  She denies any significant shortness of breath problems her chest pain and palpitation issues have resolved but her blood pressure remains elevated.    Past Medical History:   Diagnosis Date    Allergic     Bell's palsy     Diabetes mellitus     Hyperlipidemia     Hypertension     Kidney stone          Current Outpatient Medications:     acetaminophen (TYLENOL) 325 MG tablet, Take 1 tablet by mouth Every 4 (Four) Hours As Needed., Disp: , Rfl:     ACIDOPHILUS LACTOBACILLUS PO, Take  by mouth., Disp: , Rfl:     albuterol sulfate  (90 Base) MCG/ACT inhaler, , Disp: , Rfl:     aspirin 81 MG EC tablet, Take 1 tablet by mouth Daily., Disp: 90 tablet, Rfl: 3    atorvastatin (Lipitor) 20 MG tablet, Take 1 tablet by mouth Every Night., Disp: 90 tablet, Rfl: 3    CETIRIZINE HCL PO, Take  by mouth., Disp: , Rfl:     Cholecalciferol (VITAMIN D-3 PO), Take  by mouth., Disp: , Rfl:     ESTRADIOL PO, Take 5 mcg by mouth. QD, Disp: , Rfl:     gabapentin (NEURONTIN) 300 MG capsule, Take 1 capsule by mouth 2 (Two) Times a Day., Disp: 180 capsule, Rfl: 1    metFORMIN ER (GLUCOPHAGE-XR) 500 MG 24 hr tablet, Take 2 tablets by mouth 2 (Two) Times a Day With Meals., Disp: 360 tablet, Rfl: 3    metoprolol tartrate (LOPRESSOR) 25 MG tablet, Take 1 tablet by mouth 2 (Two) Times a Day., Disp: 60 tablet, Rfl: 2     "multivitamin with minerals tablet tablet, Take 1 tablet by mouth Daily., Disp: , Rfl:     Omega-3 Fatty Acids (FISH OIL OMEGA-3 PO), Take  by mouth., Disp: , Rfl:     valsartan-hydrochlorothiazide (DIOVAN-HCT) 320-12.5 MG per tablet, Take 1 tablet by mouth Daily., Disp: 90 tablet, Rfl: 3    Medications Discontinued During This Encounter   Medication Reason    lisinopril (PRINIVIL,ZESTRIL) 40 MG tablet      Allergies   Allergen Reactions    Fenofibrate Micronized Other (See Comments)     Elevated liver enzymes    Oxycodone-Acetaminophen Itching    Measles Mumps And Rubella Virus Vaccine Live Other (See Comments)     Mump like symptoms        Social History     Tobacco Use    Smoking status: Never     Passive exposure: Never    Smokeless tobacco: Never   Vaping Use    Vaping Use: Never used   Substance Use Topics    Alcohol use: Never    Drug use: Never       Family History   Problem Relation Age of Onset    Hypertension Mother     Thyroid disease Mother     Atrial fibrillation Mother     Arthritis Mother     Cancer Mother     Depression Mother     Hyperlipidemia Mother     Hyperlipidemia Father     Stroke Father     Diabetes Father     Heart disease Father     Arthritis Father     Cancer Father     Cancer Maternal Grandfather     Kidney disease Maternal Grandmother     Cancer Paternal Grandfather     COPD Paternal Grandfather     Diabetes Paternal Grandmother     Cancer Sister     Depression Sister         Objective     BP (!) 212/88 (BP Location: Right arm)   Pulse 67   Ht 157.5 cm (62\")   Wt 83.5 kg (184 lb)   BMI 33.65 kg/m²       Physical Exam    General Appearance:   no acute distress  Alert and oriented x3  HENT:   lips not cyanotic  Atraumatic  Neck:  No jvd   supple  Respiratory:  no respiratory distress  normal breath sounds  no rales  Cardiovascular:  Regular rate and rhythm  no S3, no S4   no murmur  no rub  Extremities  No cyanosis  lower extremity edema: Trace  Skin:   warm, dry  No rashes    Result " "Review :{Labs  Result Review  Imaging  Med Tab  Media :23}   {The following data was reviewed by (Optional):[id number]}  proBNP   Date Value Ref Range Status   11/27/2023 <36.0 0.0 - 900.0 pg/mL Final     CMP          5/3/2023    09:52 11/15/2023    11:49 11/27/2023    11:39   CMP   Glucose 113  101  122    BUN 11  10  11    Creatinine 0.47  0.66  0.59    EGFR 111.9  102.5  105.3    Sodium 136  134  132    Potassium 4.3  4.6  4.2    Chloride 100  97  96    Calcium 10.2  10.1  10.3    Total Protein 6.7  7.1  7.1    Albumin 4.4  4.5  4.5    Globulin 2.3  2.6  2.6    Total Bilirubin 0.3  0.4  0.3    Alkaline Phosphatase 42  42  45    AST (SGOT) 20  22  21    ALT (SGPT) 21  25  24    Albumin/Globulin Ratio 1.9  1.7  1.7    BUN/Creatinine Ratio 23.4  15.2  18.6    Anion Gap 12.3  13.1  13.4      CBC w/diff          5/3/2023    09:52 11/27/2023    11:39   CBC w/Diff   WBC 6.81  8.36    RBC 4.37  4.73    Hemoglobin 12.9  13.8    Hematocrit 37.4  40.7    MCV 85.6  86.0    MCH 29.5  29.2    MCHC 34.5  33.9    RDW 12.8  12.8    Platelets 270  315    Neutrophil Rel %  63.9    Immature Granulocyte Rel %  0.2    Lymphocyte Rel %  25.7    Monocyte Rel %  4.5    Eosinophil Rel %  4.7    Basophil Rel %  1.0       Lab Results   Component Value Date    TSH 2.020 11/15/2023      Lab Results   Component Value Date    FREET4 1.08 11/15/2023      No results found for: \"DDIMERQUANT\"  Magnesium   Date Value Ref Range Status   11/27/2023 1.9 1.6 - 2.6 mg/dL Final      No results found for: \"DIGOXIN\"   Lab Results   Component Value Date    TROPONINT <6 11/27/2023      No results found for: \"POCTROP\"(       Lipid Panel          5/3/2023    09:52 11/15/2023    11:49   Lipid Panel   Total Cholesterol 151  165    Triglycerides 393  431    HDL Cholesterol 25  28    VLDL Cholesterol 61  68    LDL Cholesterol  65  69    LDL/HDL Ratio 1.90  1.81             No results found for this or any previous visit.                   The ASCVD Risk score " (Murphy HAYNES, et al., 2019) failed to calculate for the following reasons:    The valid systolic blood pressure range is 90 to 200 mmHg     Diagnoses and all orders for this visit:    1. Chest pain, atypical (Primary)  Assessment & Plan:  Patient with some chest pressure sensation may be hypertension related or muscle skeletal however she does underlying cardiovascular risk factors as well with her diabetes and hypertension recommend noninvasive stress test and echocardiogram for further risk assessment.    Orders:  -     Adult Transthoracic Echo Complete W/ Cont if Necessary Per Protocol; Future  -     Stress Test With Myocardial Perfusion One Day; Future    2. Benign essential hypertension  Assessment & Plan:  Patient with elevated blood pressure may be underlying root cause for some of her symptoms changing to a combination blood pressure medication from lisinopril to valsartan HCTZ 320/12.5 mg once a day.  Counseled side effects will repeat a BMP in 2 weeks.  Counseled patient on  low-sodium diet of less than 2 g  Aerobic activity 30 minutes a day 5 times a week  Weight loss        Orders:  -     Basic Metabolic Panel; Future    3. Palpitations  Assessment & Plan:  Baseline EKG is normal suspect the most likely etiology is benign PACs or PVCs recommend Holter monitor for further assessment of any dysrhythmias    Orders:  -     Holter Monitor - 24 Hour; Future    4. Hypertriglyceridemia    Other orders  -     valsartan-hydrochlorothiazide (DIOVAN-HCT) 320-12.5 MG per tablet; Take 1 tablet by mouth Daily.  Dispense: 90 tablet; Refill: 3            Follow Up {Instructions Charge Capture  Follow-up Communications :23}    Return in about 6 weeks (around 3/7/2024).          Patient was given instructions and counseling regarding her condition or for health maintenance advice. Please see specific information pulled into the AVS if appropriate.

## 2024-01-23 NOTE — TELEPHONE ENCOUNTER
Spoke to pt and scheduled her for a new pt appt with brian gomez for [facility] on 02/1     Pt stated understanding   Pt aware to come early to fill out new pt paperwork  Verified address

## 2024-01-25 ENCOUNTER — OFFICE VISIT (OUTPATIENT)
Dept: CARDIOLOGY | Facility: CLINIC | Age: 59
End: 2024-01-25
Payer: COMMERCIAL

## 2024-01-25 VITALS
SYSTOLIC BLOOD PRESSURE: 212 MMHG | HEIGHT: 62 IN | DIASTOLIC BLOOD PRESSURE: 88 MMHG | WEIGHT: 184 LBS | BODY MASS INDEX: 33.86 KG/M2 | HEART RATE: 67 BPM

## 2024-01-25 DIAGNOSIS — R00.2 PALPITATIONS: ICD-10-CM

## 2024-01-25 DIAGNOSIS — I10 BENIGN ESSENTIAL HYPERTENSION: Chronic | ICD-10-CM

## 2024-01-25 DIAGNOSIS — E78.1 HYPERTRIGLYCERIDEMIA: Chronic | ICD-10-CM

## 2024-01-25 DIAGNOSIS — R07.89 CHEST PAIN, ATYPICAL: Primary | ICD-10-CM

## 2024-01-25 PROCEDURE — 99204 OFFICE O/P NEW MOD 45 MIN: CPT | Performed by: INTERNAL MEDICINE

## 2024-01-25 RX ORDER — VALSARTAN AND HYDROCHLOROTHIAZIDE 320; 12.5 MG/1; MG/1
1 TABLET, FILM COATED ORAL DAILY
Qty: 90 TABLET | Refills: 3 | Status: SHIPPED | OUTPATIENT
Start: 2024-01-25 | End: 2025-01-24

## 2024-01-25 NOTE — PROGRESS NOTES
Chief Complaint  Palpitations, Chest Pain, and Hypertension      History of Present Illness  Johnna Islas presents to [facility] CARDIOLOGY  Patient is a 58-year-old with a previous history of hypertension dyslipidemia who started having episodes and problems with heart palpitations as well as some substernal chest discomfort in November.  The patient was seen in the emergency room at that time EKG and enzymes were unremarkable but she was found to have elevation of blood pressure with systolic sometimes getting up in the 200 range.  She also reports some mild issues with lower extremity edema.  She denies any significant shortness of breath problems her chest pain and palpitation issues have resolved but her blood pressure remains elevated.  Past Medical History:   Diagnosis Date    Allergic     Bell's palsy     Diabetes mellitus     Hyperlipidemia     Hypertension     Kidney stone          Current Outpatient Medications:     acetaminophen (TYLENOL) 325 MG tablet, Take 1 tablet by mouth Every 4 (Four) Hours As Needed., Disp: , Rfl:     ACIDOPHILUS LACTOBACILLUS PO, Take  by mouth., Disp: , Rfl:     albuterol sulfate  (90 Base) MCG/ACT inhaler, , Disp: , Rfl:     aspirin 81 MG EC tablet, Take 1 tablet by mouth Daily., Disp: 90 tablet, Rfl: 3    atorvastatin (Lipitor) 20 MG tablet, Take 1 tablet by mouth Every Night., Disp: 90 tablet, Rfl: 3    CETIRIZINE HCL PO, Take  by mouth., Disp: , Rfl:     Cholecalciferol (VITAMIN D-3 PO), Take  by mouth., Disp: , Rfl:     ESTRADIOL PO, Take 5 mcg by mouth. QD, Disp: , Rfl:     gabapentin (NEURONTIN) 300 MG capsule, Take 1 capsule by mouth 2 (Two) Times a Day., Disp: 180 capsule, Rfl: 1    metFORMIN ER (GLUCOPHAGE-XR) 500 MG 24 hr tablet, Take 2 tablets by mouth 2 (Two) Times a Day With Meals., Disp: 360 tablet, Rfl: 3    metoprolol tartrate (LOPRESSOR) 25 MG tablet, Take 1 tablet by mouth 2 (Two) Times a Day., Disp: 60 tablet, Rfl: 2     "multivitamin with minerals tablet tablet, Take 1 tablet by mouth Daily., Disp: , Rfl:     Omega-3 Fatty Acids (FISH OIL OMEGA-3 PO), Take  by mouth., Disp: , Rfl:     valsartan-hydrochlorothiazide (DIOVAN-HCT) 320-12.5 MG per tablet, Take 1 tablet by mouth Daily., Disp: 90 tablet, Rfl: 3    Medications Discontinued During This Encounter   Medication Reason    lisinopril (PRINIVIL,ZESTRIL) 40 MG tablet      Allergies   Allergen Reactions    Fenofibrate Micronized Other (See Comments)     Elevated liver enzymes    Oxycodone-Acetaminophen Itching    Measles Mumps And Rubella Virus Vaccine Live Other (See Comments)     Mump like symptoms        Social History     Tobacco Use    Smoking status: Never     Passive exposure: Never    Smokeless tobacco: Never   Vaping Use    Vaping Use: Never used   Substance Use Topics    Alcohol use: Never    Drug use: Never       Family History   Problem Relation Age of Onset    Hypertension Mother     Thyroid disease Mother     Atrial fibrillation Mother     Arthritis Mother     Cancer Mother     Depression Mother     Hyperlipidemia Mother     Hyperlipidemia Father     Stroke Father     Diabetes Father     Heart disease Father     Arthritis Father     Cancer Father     Cancer Maternal Grandfather     Kidney disease Maternal Grandmother     Cancer Paternal Grandfather     COPD Paternal Grandfather     Diabetes Paternal Grandmother     Cancer Sister     Depression Sister         Objective     BP (!) 212/88 (BP Location: Right arm)   Pulse 67   Ht 157.5 cm (62\")   Wt 83.5 kg (184 lb)   BMI 33.65 kg/m²       Physical Exam    General Appearance:   no acute distress  Alert and oriented x3  HENT:   lips not cyanotic  Atraumatic  Neck:  No jvd   supple  Respiratory:  no respiratory distress  normal breath sounds  no rales  Cardiovascular:  Regular rate and rhythm  no S3, no S4   no murmur  no rub  Extremities  No cyanosis  lower extremity edema: Trace  Skin:   warm, dry  No rashes    Result " "Review :     proBNP   Date Value Ref Range Status   11/27/2023 <36.0 0.0 - 900.0 pg/mL Final     CMP          5/3/2023    09:52 11/15/2023    11:49 11/27/2023    11:39   CMP   Glucose 113  101  122    BUN 11  10  11    Creatinine 0.47  0.66  0.59    EGFR 111.9  102.5  105.3    Sodium 136  134  132    Potassium 4.3  4.6  4.2    Chloride 100  97  96    Calcium 10.2  10.1  10.3    Total Protein 6.7  7.1  7.1    Albumin 4.4  4.5  4.5    Globulin 2.3  2.6  2.6    Total Bilirubin 0.3  0.4  0.3    Alkaline Phosphatase 42  42  45    AST (SGOT) 20  22  21    ALT (SGPT) 21  25  24    Albumin/Globulin Ratio 1.9  1.7  1.7    BUN/Creatinine Ratio 23.4  15.2  18.6    Anion Gap 12.3  13.1  13.4      CBC w/diff          5/3/2023    09:52 11/27/2023    11:39   CBC w/Diff   WBC 6.81  8.36    RBC 4.37  4.73    Hemoglobin 12.9  13.8    Hematocrit 37.4  40.7    MCV 85.6  86.0    MCH 29.5  29.2    MCHC 34.5  33.9    RDW 12.8  12.8    Platelets 270  315    Neutrophil Rel %  63.9    Immature Granulocyte Rel %  0.2    Lymphocyte Rel %  25.7    Monocyte Rel %  4.5    Eosinophil Rel %  4.7    Basophil Rel %  1.0       Lab Results   Component Value Date    TSH 2.020 11/15/2023      Lab Results   Component Value Date    FREET4 1.08 11/15/2023      No results found for: \"DDIMERQUANT\"  Magnesium   Date Value Ref Range Status   11/27/2023 1.9 1.6 - 2.6 mg/dL Final      No results found for: \"DIGOXIN\"   Lab Results   Component Value Date    TROPONINT <6 11/27/2023      No results found for: \"POCTROP\"(       Lipid Panel          5/3/2023    09:52 11/15/2023    11:49   Lipid Panel   Total Cholesterol 151  165    Triglycerides 393  431    HDL Cholesterol 25  28    VLDL Cholesterol 61  68    LDL Cholesterol  65  69    LDL/HDL Ratio 1.90  1.81             No results found for this or any previous visit.             The ASCVD Risk score (Murphy HAYNES, et al., 2019) failed to calculate for the following reasons:    The valid systolic blood pressure range is " 90 to 200 mmHg     Diagnoses and all orders for this visit:    1. Chest pain, atypical (Primary)  Assessment & Plan:  Patient with some chest pressure sensation may be hypertension related or muscle skeletal however she does underlying cardiovascular risk factors as well with her diabetes and hypertension recommend noninvasive stress test and echocardiogram for further risk assessment.    Orders:  -     Adult Transthoracic Echo Complete W/ Cont if Necessary Per Protocol; Future  -     Stress Test With Myocardial Perfusion One Day; Future    2. Benign essential hypertension  Assessment & Plan:  Patient with elevated blood pressure may be underlying root cause for some of her symptoms changing to a combination blood pressure medication from lisinopril to valsartan HCTZ 320/12.5 mg once a day.  Counseled side effects will repeat a BMP in 2 weeks.  Counseled patient on  low-sodium diet of less than 2 g  Aerobic activity 30 minutes a day 5 times a week  Weight loss        Orders:  -     Basic Metabolic Panel; Future    3. Palpitations  Assessment & Plan:  Baseline EKG is normal suspect the most likely etiology is benign PACs or PVCs recommend Holter monitor for further assessment of any dysrhythmias    Orders:  -     Holter Monitor - 24 Hour; Future    4. Hypertriglyceridemia    Other orders  -     valsartan-hydrochlorothiazide (DIOVAN-HCT) 320-12.5 MG per tablet; Take 1 tablet by mouth Daily.  Dispense: 90 tablet; Refill: 3            Follow Up     Return in about 6 weeks (around 3/7/2024).          Patient was given instructions and counseling regarding her condition or for health maintenance advice. Please see specific information pulled into the AVS if appropriate.

## 2024-01-25 NOTE — ASSESSMENT & PLAN NOTE
Patient with some chest pressure sensation may be hypertension related or muscle skeletal however she does underlying cardiovascular risk factors as well with her diabetes and hypertension recommend noninvasive stress test and echocardiogram for further risk assessment.

## 2024-01-25 NOTE — ASSESSMENT & PLAN NOTE
Baseline EKG is normal suspect the most likely etiology is benign PACs or PVCs recommend Holter monitor for further assessment of any dysrhythmias

## 2024-01-25 NOTE — ASSESSMENT & PLAN NOTE
Patient with elevated blood pressure may be underlying root cause for some of her symptoms changing to a combination blood pressure medication from lisinopril to valsartan HCTZ 320/12.5 mg once a day.  Counseled side effects will repeat a BMP in 2 weeks.  Counseled patient on  low-sodium diet of less than 2 g  Aerobic activity 30 minutes a day 5 times a week  Weight loss

## 2024-01-26 ENCOUNTER — PATIENT ROUNDING (BHMG ONLY) (OUTPATIENT)
Dept: CARDIOLOGY | Facility: CLINIC | Age: 59
End: 2024-01-26
Payer: COMMERCIAL

## 2024-01-26 NOTE — PROGRESS NOTES
A [employer] message had been sent to the patient for PATIENT ROUNDING with [facility] [place]

## 2024-01-30 NOTE — PROGRESS NOTES
[facility]     Referring Provider: BROOK Ingram     Chief complaint:  PASH     HPI: Ms. Johnna Islas is a 59 yo woman, seen at the request of BROOK Ingram, for PASH    I personally reviewed her records and summarized her relevant breast history/imagin2021 bilateral screening mammogram at [facility]  No suspicious masses architectural distortion or micro calcifications.  No mammographic evidence of malignancy.  Routine screening in 1 year.  Scattered areas of fibroglandular density.  BI-RADS 1    3/29/2023 bilateral screening mammogram at [facility]  Breast tissue is almost entirely fat.  No mammographic evidence of malignancy.  BI-RADS 1    2023 diagnostic bilateral mammogram and left limited ultrasound at [facility]  FINDINGS:          Mammogram:  Corresponding to the patient's site of focal left breast pain, which was marked by a square shaped   marker, there is a new 2 cm focal asymmetry in the lower central left breast, anterior to middle   depth.  No suspicious mass, calcifications, or architectural distortion is seen in either breast.  Ultrasound:  Corresponding to the patient's site of focal left breast pain and mammographic asymmetry, there is   a 2.2 x 0.6 x 1.1 cm hypoechoic mass or patchy fibroglandular tissue at 06:00, 1 cm from the   nipple.  Given the mammographic and sonographic features, this may represent PASH.  Since this is new on   mammogram, recommend ultrasound-guided biopsy for definitive diagnosis.  IMPRESSION:  Recommend ultrasound-guided biopsy of the left breast.  I discussed results with the patient following the exam.  She will be scheduled for biopsy, and our   staff will notify the ordering clinician's office.  No mammographic signs of malignancy in the right breast.  RECOMMENDATION(S):               ULTRASOUND-GUIDED CORE BIOPSY: LEFT BREAST    BIRADS:               DIAGNOSTIC CATEGORY  4    12/11/2023 left breast ultrasound-guided biopsy at [facility]  ADDENDUM:   Final surgical pathology report describes PASH.  Imaging findings of pathology findings are concordant.  Given the size of the lesion, I would recommend surgical referral for consideration of excision.      She has a personal history of diabetes, hypertension, hyperlipidemia, GERD, total hysterectomy in 2002, estrogen therapy since 2002.      She denies any family history of breast or ovarian cancer.     Today she presents with concerns regarding biopsy results of PASH.  She denies any breast lumps, pain, skin changes, or nipple discharge.         Review of Systems - Oncology    Medications:    Current Outpatient Medications:     acetaminophen (TYLENOL) 325 MG tablet, Take 1 tablet by mouth Every 4 (Four) Hours As Needed., Disp: , Rfl:     ACIDOPHILUS LACTOBACILLUS PO, Take  by mouth., Disp: , Rfl:     albuterol sulfate  (90 Base) MCG/ACT inhaler, , Disp: , Rfl:     aspirin 81 MG EC tablet, Take 1 tablet by mouth Daily., Disp: 90 tablet, Rfl: 3    atorvastatin (Lipitor) 20 MG tablet, Take 1 tablet by mouth Every Night., Disp: 90 tablet, Rfl: 3    CETIRIZINE HCL PO, Take  by mouth., Disp: , Rfl:     Cholecalciferol (VITAMIN D-3 PO), Take  by mouth., Disp: , Rfl:     ESTRADIOL PO, Take 5 mcg by mouth. QD, Disp: , Rfl:     gabapentin (NEURONTIN) 300 MG capsule, Take 1 capsule by mouth 2 (Two) Times a Day., Disp: 180 capsule, Rfl: 1    metFORMIN ER (GLUCOPHAGE-XR) 500 MG 24 hr tablet, Take 2 tablets by mouth 2 (Two) Times a Day With Meals., Disp: 360 tablet, Rfl: 3    metoprolol tartrate (LOPRESSOR) 25 MG tablet, Take 1 tablet by mouth 2 (Two) Times a Day., Disp: 60 tablet, Rfl: 2    multivitamin with minerals tablet tablet, Take 1 tablet by mouth Daily., Disp: , Rfl:     Omega-3 Fatty Acids (FISH OIL OMEGA-3 PO), Take  by mouth., Disp: , Rfl:     valsartan-hydrochlorothiazide (DIOVAN-HCT) 320-12.5 MG per tablet, Take 1  tablet by mouth Daily., Disp: 90 tablet, Rfl: 3    Allergies:  Allergies   Allergen Reactions    Fenofibrate Micronized Other (See Comments)     Elevated liver enzymes    Oxycodone-Acetaminophen Itching    Measles Mumps And Rubella Virus Vaccine Live Other (See Comments)     Mump like symptoms       Medical history:  Past Medical History:   Diagnosis Date    Allergic     Bell's palsy     Diabetes mellitus     Hyperlipidemia     Hypertension     Kidney stone        Surgical History:  Past Surgical History:   Procedure Laterality Date    ADENOIDECTOMY      APPENDECTOMY      BREAST BIOPSY Left 2023     SECTION      CHOLECYSTECTOMY      COLONOSCOPY      HYSTERECTOMY  2002    total hysterectomy    TONSILLECTOMY      TOTAL ABDOMINAL HYSTERECTOMY WITH SALPINGO OOPHORECTOMY         Family History:  Family History   Problem Relation Age of Onset    Hypertension Mother     Thyroid disease Mother     Atrial fibrillation Mother     Arthritis Mother     Cancer Mother     Depression Mother     Hyperlipidemia Mother     Hyperlipidemia Father     Stroke Father     Diabetes Father     Heart disease Father     Arthritis Father     Cancer Father     Cancer Maternal Grandfather     Kidney disease Maternal Grandmother     Cancer Paternal Grandfather     COPD Paternal Grandfather     Diabetes Paternal Grandmother     Cancer Sister     Depression Sister        Social History:   Social History     Socioeconomic History    Marital status:    Tobacco Use    Smoking status: Never     Passive exposure: Never    Smokeless tobacco: Never   Vaping Use    Vaping Use: Never used   Substance and Sexual Activity    Alcohol use: Never    Drug use: Never    Sexual activity: Yes     Partners: Male     Birth control/protection: Post-menopausal, Hysterectomy, Surgical     Patient drinks 2 servings of caffeine per day.       GYNECOLOGIC HISTORY:   . P: 3. AB: 2.  Last menstrual period: 37  Age at menarche: 13  Age at first  childbirth: 25  Lactation/How long: N/A  Age at menopause: 37 pt had total hysterectomy in 2002  Total years of oral contraceptive use: 10+  Total years of hormone replacement therapy: 10+ years       Physical Exam  Vitals:    02/01/24 0938   BP: 162/92   Pulse: 76   SpO2: 98%     ECOG 0 - Asymptomatic  General: NAD, well appearing  Psych: a&o x 3, normal mood and affect  Eyes: EOMI, no scleral icterus  ENMT: neck supple without masses or thyromegaly, mucus membranes moist  Resp: normal effort, CTAB  CV: RRR, no murmurs, no edema   GI: soft, NT, ND  MSK: normal gait, normal ROM in bilateral shoulders  Lymph nodes: no cervical, supraclavicular or axillary lymphadenopathy  Breast: symmetric, small  Right: No visible abnormalities on inspection while seated, with arms raised or hands on hips. No masses, skin changes, or nipple abnormalities.  Left: No visible abnormalities on inspection while seated, with arms raised or hands on hips. No masses, skin changes, or nipple abnormalities.      Assessment:    PASH  Estrogen therapy for 22 years      Discussion:  Pseudoangiomatous stromal hyperplasia (PASH) is a type of non-cancerous breast lesion. Tumorous PASH presents as a firm, painless breast mass or a dense region on a mammogram.[1][2][3] The underlying cause of PASH is unclear. There is some evidence in the literature to suggest that hormonal factors may play a role. While PASH itself is benign, it may mimic cancer (specifically angiosarcoma). For this reason, a biopsy may be recommended.  ( [facility], 2021)  According to T  she is average risk for breast cancer at 6.1%    Plan:  Screening mammogram in November followed by exam  Monthly breast self examinations  Return to the office of any new breast concerns      BROOK Morgan have spent 30 mins in face to face time with the patient and in chart review.    CC:  Sarah Beth Falcon, Robson Martinez DO    EMR Dragon/transcription disclaimer:  Dictated using  Roger dictation

## 2024-02-01 ENCOUNTER — OFFICE VISIT (OUTPATIENT)
Dept: SURGERY | Facility: CLINIC | Age: 59
End: 2024-02-01
Payer: COMMERCIAL

## 2024-02-01 VITALS
OXYGEN SATURATION: 98 % | DIASTOLIC BLOOD PRESSURE: 92 MMHG | HEIGHT: 62 IN | SYSTOLIC BLOOD PRESSURE: 162 MMHG | BODY MASS INDEX: 33.86 KG/M2 | WEIGHT: 184 LBS | HEART RATE: 76 BPM

## 2024-02-01 DIAGNOSIS — Z12.31 ENCOUNTER FOR SCREENING MAMMOGRAM FOR MALIGNANT NEOPLASM OF BREAST: Primary | ICD-10-CM

## 2024-02-01 DIAGNOSIS — N64.89 PSEUDOANGIOMATOUS STROMAL HYPERPLASIA OF BREAST: ICD-10-CM

## 2024-02-08 DIAGNOSIS — H65.01 NON-RECURRENT ACUTE SEROUS OTITIS MEDIA OF RIGHT EAR: ICD-10-CM

## 2024-02-08 DIAGNOSIS — J01.10 ACUTE NON-RECURRENT FRONTAL SINUSITIS: ICD-10-CM

## 2024-02-12 RX ORDER — AMOXICILLIN AND CLAVULANATE POTASSIUM 875; 125 MG/1; MG/1
TABLET, FILM COATED ORAL
Qty: 28 TABLET | Refills: 0 | Status: SHIPPED | OUTPATIENT
Start: 2024-02-12

## 2024-02-21 ENCOUNTER — HOSPITAL ENCOUNTER (OUTPATIENT)
Dept: NUCLEAR MEDICINE | Facility: HOSPITAL | Age: 59
Discharge: HOME | End: 2024-02-21
Payer: COMMERCIAL

## 2024-02-21 DIAGNOSIS — R07.89 CHEST PAIN, ATYPICAL: ICD-10-CM

## 2024-02-21 PROCEDURE — 25010000002 REGADENOSON 0.4 MG/5ML SOLUTION: Performed by: INTERNAL MEDICINE

## 2024-02-21 PROCEDURE — 78452 HT MUSCLE IMAGE SPECT MULT: CPT

## 2024-02-21 PROCEDURE — 0 TECHNETIUM TETROFOSMIN KIT: Performed by: INTERNAL MEDICINE

## 2024-02-21 PROCEDURE — 93017 CV STRESS TEST TRACING ONLY: CPT

## 2024-02-21 PROCEDURE — A9502 TC99M TETROFOSMIN: HCPCS | Performed by: INTERNAL MEDICINE

## 2024-02-21 RX ORDER — REGADENOSON 0.08 MG/ML
0.4 INJECTION, SOLUTION INTRAVENOUS
Status: COMPLETED | OUTPATIENT
Start: 2024-02-21 | End: 2024-02-21

## 2024-02-21 RX ADMIN — TETROFOSMIN 1 DOSE: 1.38 INJECTION, POWDER, LYOPHILIZED, FOR SOLUTION INTRAVENOUS at 08:25

## 2024-02-21 RX ADMIN — TETROFOSMIN 1 DOSE: 1.38 INJECTION, POWDER, LYOPHILIZED, FOR SOLUTION INTRAVENOUS at 09:35

## 2024-02-21 RX ADMIN — REGADENOSON 0.4 MG: 0.08 INJECTION, SOLUTION INTRAVENOUS at 09:35

## 2024-02-23 ENCOUNTER — TELEPHONE (OUTPATIENT)
Dept: CARDIOLOGY | Facility: CLINIC | Age: 59
End: 2024-02-23
Payer: COMMERCIAL

## 2024-02-23 LAB
BH CV IMMEDIATE POST RECOVERY TECH DATA SYMPTOMS: NORMAL
BH CV IMMEDIATE POST TECH DATA BLOOD PRESSURE: NORMAL MMHG
BH CV IMMEDIATE POST TECH DATA HEART RATE: 125 BPM
BH CV IMMEDIATE POST TECH DATA OXYGEN SATS: 98 %
BH CV REST NUCLEAR ISOTOPE DOSE: 10.4 MCI
BH CV SIX MINUTE RECOVERY TECH DATA BLOOD PRESSURE: NORMAL
BH CV SIX MINUTE RECOVERY TECH DATA HEART RATE: 86 BPM
BH CV SIX MINUTE RECOVERY TECH DATA OXYGEN SATURATION: 98 %
BH CV STRESS BP STAGE 1: NORMAL
BH CV STRESS BP STAGE 2: NORMAL
BH CV STRESS COMMENTS STAGE 1: NORMAL
BH CV STRESS COMMENTS STAGE 2: NORMAL
BH CV STRESS DOSE REGADENOSON STAGE 1: 0.4
BH CV STRESS DURATION MIN STAGE 1: 2
BH CV STRESS DURATION MIN STAGE 2: 2
BH CV STRESS DURATION SEC STAGE 1: 0
BH CV STRESS DURATION SEC STAGE 2: 0
BH CV STRESS HR STAGE 1: 100
BH CV STRESS HR STAGE 2: 130
BH CV STRESS NUCLEAR ISOTOPE DOSE: 37 MCI
BH CV STRESS O2 STAGE 1: 98
BH CV STRESS O2 STAGE 2: 98
BH CV STRESS PROTOCOL 1: NORMAL
BH CV STRESS RECOVERY BP: NORMAL MMHG
BH CV STRESS RECOVERY HR: 86 BPM
BH CV STRESS RECOVERY O2: 96 %
BH CV STRESS STAGE 1: 1
BH CV STRESS STAGE 2: 2
BH CV THREE MINUTE POST TECH DATA BLOOD PRESSURE: NORMAL MMHG
BH CV THREE MINUTE POST TECH DATA HEART RATE: 102 BPM
BH CV THREE MINUTE POST TECH DATA OXYGEN SATURATION: 98 %
BH CV THREE MINUTE RECOVERY TECH DATA SYMPTOM: NORMAL
LV EF NUC BP: 66 %
MAXIMAL PREDICTED HEART RATE: 163 BPM
PERCENT MAX PREDICTED HR: 79.75 %
STRESS BASELINE BP: NORMAL MMHG
STRESS BASELINE HR: 70 BPM
STRESS O2 SAT REST: 94 %
STRESS PERCENT HR: 94 %
STRESS POST ESTIMATED WORKLOAD: 2.3 METS
STRESS POST EXERCISE DUR MIN: 4 MIN
STRESS POST EXERCISE DUR SEC: 0 SEC
STRESS POST O2 SAT PEAK: 98 %
STRESS POST PEAK BP: NORMAL MMHG
STRESS POST PEAK HR: 130 BPM
STRESS TARGET HR: 139 BPM

## 2024-02-23 NOTE — TELEPHONE ENCOUNTER
----- Message from BROOK Smith sent at 2/23/2024 11:54 AM EST -----  Stress test is negative for ischemia, continue with echocardiogram on 3/8/2024 as scheduled, notify office if symptoms persist or worsen.  Would recommend changing follow-up with Dr. Busch to be the day after the echocardiogram instead of the day before

## 2024-02-27 ENCOUNTER — TELEPHONE (OUTPATIENT)
Dept: CARDIOLOGY | Facility: CLINIC | Age: 59
End: 2024-02-27
Payer: COMMERCIAL

## 2024-02-27 NOTE — TELEPHONE ENCOUNTER
----- Message from BROOK Smith sent at 2/27/2024 12:56 PM EST -----  Holter monitor shows normal sinus rhythm with average heart rate of 77 bpm.  There is no arrhythmia noted.  Continue with echocardiogram on 3/8/2024

## 2024-03-08 ENCOUNTER — TELEPHONE (OUTPATIENT)
Dept: CARDIOLOGY | Facility: CLINIC | Age: 59
End: 2024-03-08
Payer: COMMERCIAL

## 2024-03-08 ENCOUNTER — HOSPITAL ENCOUNTER (OUTPATIENT)
Dept: CARDIOLOGY | Facility: HOSPITAL | Age: 59
Discharge: HOME | End: 2024-03-08
Payer: COMMERCIAL

## 2024-03-08 DIAGNOSIS — R07.89 CHEST PAIN, ATYPICAL: ICD-10-CM

## 2024-03-08 LAB
BH CV ECHO MEAS - AO ROOT DIAM: 3.1 CM
BH CV ECHO MEAS - EDV(CUBED): 58.2 ML
BH CV ECHO MEAS - EDV(MOD-SP2): 69.8 ML
BH CV ECHO MEAS - EDV(MOD-SP4): 74 ML
BH CV ECHO MEAS - EF(MOD-BP): 66.1 %
BH CV ECHO MEAS - EF(MOD-SP2): 65.6 %
BH CV ECHO MEAS - EF(MOD-SP4): 68.8 %
BH CV ECHO MEAS - ESV(CUBED): 20.3 ML
BH CV ECHO MEAS - ESV(MOD-SP2): 24 ML
BH CV ECHO MEAS - ESV(MOD-SP4): 23.1 ML
BH CV ECHO MEAS - FS: 29.6 %
BH CV ECHO MEAS - IVS/LVPW: 1.04 CM
BH CV ECHO MEAS - IVSD: 1.09 CM
BH CV ECHO MEAS - LA DIMENSION: 3.7 CM
BH CV ECHO MEAS - LAT PEAK E' VEL: 7.1 CM/SEC
BH CV ECHO MEAS - LV DIASTOLIC VOL/BSA (35-75): 40.1 CM2
BH CV ECHO MEAS - LV MASS(C)D: 133.7 GRAMS
BH CV ECHO MEAS - LV SYSTOLIC VOL/BSA (12-30): 12.5 CM2
BH CV ECHO MEAS - LVIDD: 3.9 CM
BH CV ECHO MEAS - LVIDS: 2.7 CM
BH CV ECHO MEAS - LVOT AREA: 3.2 CM2
BH CV ECHO MEAS - LVOT DIAM: 2.01 CM
BH CV ECHO MEAS - LVPWD: 1.05 CM
BH CV ECHO MEAS - MED PEAK E' VEL: 6.5 CM/SEC
BH CV ECHO MEAS - MV A MAX VEL: 69.1 CM/SEC
BH CV ECHO MEAS - MV DEC SLOPE: 435.9 CM/SEC2
BH CV ECHO MEAS - MV DEC TIME: 0.16 SEC
BH CV ECHO MEAS - MV E MAX VEL: 68.6 CM/SEC
BH CV ECHO MEAS - MV E/A: 0.99
BH CV ECHO MEAS - RVDD: 2.8 CM
BH CV ECHO MEAS - SI(MOD-SP2): 24.8 ML/M2
BH CV ECHO MEAS - SI(MOD-SP4): 27.6 ML/M2
BH CV ECHO MEAS - SV(MOD-SP2): 45.8 ML
BH CV ECHO MEAS - SV(MOD-SP4): 50.9 ML
BH CV ECHO MEAS - TAPSE (>1.6): 1.83 CM
BH CV ECHO MEASUREMENTS AVERAGE E/E' RATIO: 10.09
LEFT ATRIUM VOLUME INDEX: 29.9 ML/M2

## 2024-03-08 PROCEDURE — 93306 TTE W/DOPPLER COMPLETE: CPT

## 2024-03-08 NOTE — TELEPHONE ENCOUNTER
----- Message from BROOK Smith sent at 3/8/2024  2:01 PM EST -----  Echocardiogram shows normal heart function and no significant valvular disease, follow-up as scheduled

## 2024-03-18 ENCOUNTER — LAB (OUTPATIENT)
Dept: LAB | Facility: HOSPITAL | Age: 59
End: 2024-03-18
Payer: COMMERCIAL

## 2024-03-18 ENCOUNTER — OFFICE VISIT (OUTPATIENT)
Dept: CARDIOLOGY | Facility: CLINIC | Age: 59
End: 2024-03-18
Payer: COMMERCIAL

## 2024-03-18 VITALS
HEIGHT: 62 IN | WEIGHT: 185 LBS | DIASTOLIC BLOOD PRESSURE: 75 MMHG | SYSTOLIC BLOOD PRESSURE: 182 MMHG | BODY MASS INDEX: 34.04 KG/M2 | HEART RATE: 66 BPM

## 2024-03-18 DIAGNOSIS — R00.2 PALPITATIONS: ICD-10-CM

## 2024-03-18 DIAGNOSIS — I10 BENIGN ESSENTIAL HYPERTENSION: Primary | ICD-10-CM

## 2024-03-18 DIAGNOSIS — R07.89 CHEST PAIN, ATYPICAL: ICD-10-CM

## 2024-03-18 DIAGNOSIS — R06.83 SNORING: ICD-10-CM

## 2024-03-18 DIAGNOSIS — I10 BENIGN ESSENTIAL HYPERTENSION: Chronic | ICD-10-CM

## 2024-03-18 LAB
ANION GAP SERPL CALCULATED.3IONS-SCNC: 13.3 MMOL/L (ref 5–15)
BUN SERPL-MCNC: 10 MG/DL (ref 6–20)
BUN/CREAT SERPL: 14.9 (ref 7–25)
CALCIUM SPEC-SCNC: 10.6 MG/DL (ref 8.6–10.5)
CHLORIDE SERPL-SCNC: 94 MMOL/L (ref 98–107)
CO2 SERPL-SCNC: 27.7 MMOL/L (ref 22–29)
CREAT SERPL-MCNC: 0.67 MG/DL (ref 0.57–1)
EGFRCR SERPLBLD CKD-EPI 2021: 102.1 ML/MIN/1.73
GLUCOSE SERPL-MCNC: 119 MG/DL (ref 65–99)
POTASSIUM SERPL-SCNC: 4.1 MMOL/L (ref 3.5–5.2)
SODIUM SERPL-SCNC: 135 MMOL/L (ref 136–145)

## 2024-03-18 PROCEDURE — 36415 COLL VENOUS BLD VENIPUNCTURE: CPT

## 2024-03-18 PROCEDURE — 99214 OFFICE O/P EST MOD 30 MIN: CPT | Performed by: INTERNAL MEDICINE

## 2024-03-18 PROCEDURE — 80048 BASIC METABOLIC PNL TOTAL CA: CPT

## 2024-03-18 RX ORDER — AMLODIPINE BESYLATE VALSARTAN HYDROCHLOROTHIAZIDE 5; 160; 25 MG/1; MG/1; MG/1
TABLET, FILM COATED ORAL
Qty: 90 TABLET | Refills: 3 | Status: SHIPPED | OUTPATIENT
Start: 2024-03-18

## 2024-03-18 NOTE — PROGRESS NOTES
Chief Complaint  Follow-up, Hypertension, Hyperlipidemia, and Palpitations    Subjective    Patient following up today still with persistent hypertension issues but improved tolerating her current medications.  She reports also stable heart palpitation issues    Past Medical History:   Diagnosis Date    Allergic     Arrhythmia     Hu's palsy     Diabetes mellitus     Hyperlipidemia     Hypertension     Kidney stone          Current Outpatient Medications:     acetaminophen (TYLENOL) 325 MG tablet, Take 1 tablet by mouth Every 4 (Four) Hours As Needed., Disp: , Rfl:     ACIDOPHILUS LACTOBACILLUS PO, Take  by mouth., Disp: , Rfl:     albuterol sulfate  (90 Base) MCG/ACT inhaler, , Disp: , Rfl:     aspirin 81 MG EC tablet, Take 1 tablet by mouth Daily., Disp: 90 tablet, Rfl: 3    atorvastatin (Lipitor) 20 MG tablet, Take 1 tablet by mouth Every Night., Disp: 90 tablet, Rfl: 3    CETIRIZINE HCL PO, Take  by mouth., Disp: , Rfl:     Cholecalciferol (VITAMIN D-3 PO), Take  by mouth., Disp: , Rfl:     ESTRADIOL PO, Take 5 mcg by mouth. QD, Disp: , Rfl:     gabapentin (NEURONTIN) 300 MG capsule, Take 1 capsule by mouth 2 (Two) Times a Day., Disp: 180 capsule, Rfl: 1    metFORMIN ER (GLUCOPHAGE-XR) 500 MG 24 hr tablet, Take 2 tablets by mouth 2 (Two) Times a Day With Meals., Disp: 360 tablet, Rfl: 3    metoprolol tartrate (LOPRESSOR) 25 MG tablet, TAKE 1 TABLET BY MOUTH TWICE A DAY, Disp: 180 tablet, Rfl: 3    multivitamin with minerals tablet tablet, Take 1 tablet by mouth Daily., Disp: , Rfl:     Omega-3 Fatty Acids (FISH OIL OMEGA-3 PO), Take  by mouth., Disp: , Rfl:     amLODIPine-Valsartan-HCTZ 5-160-25 MG tablet, One tablet PO q24h, Disp: 90 tablet, Rfl: 3    Medications Discontinued During This Encounter   Medication Reason    amoxicillin-clavulanate (AUGMENTIN) 875-125 MG per tablet *Therapy completed    valsartan-hydrochlorothiazide (DIOVAN-HCT) 320-12.5 MG per tablet      Allergies   Allergen Reactions     "Fenofibrate Micronized Other (See Comments)     Elevated liver enzymes    Oxycodone-Acetaminophen Itching    Measles Mumps And Rubella Virus Vaccine Live Other (See Comments)     Mump like symptoms        Social History     Tobacco Use    Smoking status: Never     Passive exposure: Never    Smokeless tobacco: Never   Vaping Use    Vaping status: Never Used   Substance Use Topics    Alcohol use: Never    Drug use: Never       Family History   Problem Relation Age of Onset    Hypertension Mother     Thyroid disease Mother     Atrial fibrillation Mother     Arthritis Mother     Cancer Mother     Depression Mother     Hyperlipidemia Mother     Arrhythmia Mother     Hyperlipidemia Father     Stroke Father     Diabetes Father     Heart disease Father     Arthritis Father     Cancer Father     Arrhythmia Father     Cancer Maternal Grandfather     Kidney disease Maternal Grandmother     Cancer Paternal Grandfather     COPD Paternal Grandfather     Diabetes Paternal Grandmother     Cancer Sister     Depression Sister     Arrhythmia Sister         Objective     BP (!) 182/75   Pulse 66   Ht 157.5 cm (62.01\")   Wt 83.9 kg (185 lb)   BMI 33.83 kg/m²       Physical Exam    General Appearance:   no acute distress  Alert and oriented x3  HENT:   lips not cyanotic  Atraumatic  Neck:  No jvd   supple  Respiratory:  no respiratory distress  normal breath sounds  no rales  Cardiovascular:  Regular rate and rhythm  no S3, no S4   no murmur  no rub  Extremities  No cyanosis  lower extremity edema: none    Skin:   warm, dry  No rashes      Result Review :     proBNP   Date Value Ref Range Status   11/27/2023 <36.0 0.0 - 900.0 pg/mL Final     CMP          5/3/2023    09:52 11/15/2023    11:49 11/27/2023    11:39   CMP   Glucose 113  101  122    BUN 11  10  11    Creatinine 0.47  0.66  0.59    EGFR 111.9  102.5  105.3    Sodium 136  134  132    Potassium 4.3  4.6  4.2    Chloride 100  97  96    Calcium 10.2  10.1  10.3    Total Protein " "6.7  7.1  7.1    Albumin 4.4  4.5  4.5    Globulin 2.3  2.6  2.6    Total Bilirubin 0.3  0.4  0.3    Alkaline Phosphatase 42  42  45    AST (SGOT) 20  22  21    ALT (SGPT) 21  25  24    Albumin/Globulin Ratio 1.9  1.7  1.7    BUN/Creatinine Ratio 23.4  15.2  18.6    Anion Gap 12.3  13.1  13.4      CBC w/diff          5/3/2023    09:52 11/27/2023    11:39   CBC w/Diff   WBC 6.81  8.36    RBC 4.37  4.73    Hemoglobin 12.9  13.8    Hematocrit 37.4  40.7    MCV 85.6  86.0    MCH 29.5  29.2    MCHC 34.5  33.9    RDW 12.8  12.8    Platelets 270  315    Neutrophil Rel %  63.9    Immature Granulocyte Rel %  0.2    Lymphocyte Rel %  25.7    Monocyte Rel %  4.5    Eosinophil Rel %  4.7    Basophil Rel %  1.0       Lab Results   Component Value Date    TSH 2.020 11/15/2023      Lab Results   Component Value Date    FREET4 1.08 11/15/2023      No results found for: \"DDIMERQUANT\"  Magnesium   Date Value Ref Range Status   11/27/2023 1.9 1.6 - 2.6 mg/dL Final      No results found for: \"DIGOXIN\"   Lab Results   Component Value Date    TROPONINT <6 11/27/2023           Lipid Panel          5/3/2023    09:52 11/15/2023    11:49   Lipid Panel   Total Cholesterol 151  165    Triglycerides 393  431    HDL Cholesterol 25  28    VLDL Cholesterol 61  68    LDL Cholesterol  65  69    LDL/HDL Ratio 1.90  1.81      No results found for: \"POCTROP\"    Results for orders placed during the hospital encounter of 03/08/24    Adult Transthoracic Echo Complete W/ Cont if Necessary Per Protocol    Interpretation Summary    Left ventricular systolic function is normal. Calculated left ventricular EF = 66.1%    Left ventricular diastolic function was normal.                 Diagnoses and all orders for this visit:    1. Benign essential hypertension (Primary)  Assessment & Plan:  Patient still with elevated blood pressure will change valsartan HCTZ to Exforge HCTZ 160/25/5 daily plan on repeating BMP in 2 weeks counseled on some diet and exercise "     Orders:  -     amLODIPine-Valsartan-HCTZ 5-160-25 MG tablet; One tablet PO q24h  Dispense: 90 tablet; Refill: 3  -     Basic Metabolic Panel; Future    2. Snoring  -     Ambulatory Referral to Sleep Medicine    3. Chest pain, atypical  Assessment & Plan:  No evidence of any ischemia on stress test suspect likely muscle skeletal in nature      4. Palpitations  Assessment & Plan:  Will work optimization of blood pressure before changing any therapy continue with Lopressor 25 twice daily dosing              Follow Up     Return in about 2 months (around 5/18/2024).          Patient was given instructions and counseling regarding her condition or for health maintenance advice. Please see specific information pulled into the AVS if appropriate.

## 2024-03-18 NOTE — ASSESSMENT & PLAN NOTE
Patient still with elevated blood pressure will change valsartan HCTZ to Exforge HCTZ 160/25/5 daily plan on repeating BMP in 2 weeks counseled on some diet and exercise

## 2024-03-18 NOTE — ASSESSMENT & PLAN NOTE
Will work optimization of blood pressure before changing any therapy continue with Lopressor 25 twice daily dosing

## 2024-03-19 ENCOUNTER — TELEPHONE (OUTPATIENT)
Dept: CARDIOLOGY | Facility: CLINIC | Age: 59
End: 2024-03-19
Payer: COMMERCIAL

## 2024-03-19 NOTE — TELEPHONE ENCOUNTER
----- Message from BROOK Smith sent at 3/19/2024  9:31 AM EDT -----  Renal function and electrolytes are stable, continue current medications

## 2024-04-09 ENCOUNTER — OFFICE VISIT (OUTPATIENT)
Dept: SLEEP MEDICINE | Facility: HOSPITAL | Age: 59
End: 2024-04-09
Payer: COMMERCIAL

## 2024-04-09 VITALS
BODY MASS INDEX: 34.27 KG/M2 | WEIGHT: 186.2 LBS | DIASTOLIC BLOOD PRESSURE: 58 MMHG | HEART RATE: 72 BPM | HEIGHT: 62 IN | SYSTOLIC BLOOD PRESSURE: 135 MMHG | OXYGEN SATURATION: 93 %

## 2024-04-09 DIAGNOSIS — R29.818 SUSPECTED SLEEP APNEA: Primary | ICD-10-CM

## 2024-04-09 DIAGNOSIS — E66.9 CLASS 1 OBESITY WITH BODY MASS INDEX (BMI) OF 34.0 TO 34.9 IN ADULT, UNSPECIFIED OBESITY TYPE, UNSPECIFIED WHETHER SERIOUS COMORBIDITY PRESENT: ICD-10-CM

## 2024-04-09 PROCEDURE — 99204 OFFICE O/P NEW MOD 45 MIN: CPT | Performed by: NURSE PRACTITIONER

## 2024-04-09 PROCEDURE — G0463 HOSPITAL OUTPT CLINIC VISIT: HCPCS

## 2024-04-09 NOTE — PROGRESS NOTES
[facility]  [address] [place] [id number]  Phone: [phone]  Fax: [phone]      Johnna Islas  [id number]   1965  58 y.o.  female      Referring Provider: Moris Busch MD  PCP: Robson Jasso DO    Type of service: Initial Sleep Medicine Consult  Date of service: 4/9/2024          CHIEF COMPLAINT: Suspected sleep apnea      HISTORY OF PRESENT ILLNESS:  Thank you for asking us to see Johnna Islas.  Johnna Islas 58 y.o. is a very pleasant patient who was seen today on 4/9/2024 at [facility].  Patient presents today with symptoms of snoring, non-restorative sleep, and suspected sleep apnea.  Patient has history of tonsillectomy around 1971.  She works as an RN in the L&D department, works 2 night shifts per week.  May take a nap before working her first night shift of the week.  Gets about 6 to 8 hours of sleep per night on average.  She has been following with cardiology for her to control high blood pressure as well as palpitations and they recommended ruling out sleep apnea.        SLEEP HISTORY:  Sleep schedule:  Bedtime: Varies  Wake time: Varies  Average hours of sleep: 6-8  Number of naps per day: 0-1    Symptoms:   In addition to the above, patient reports the following associated symptoms:  Have you ever awakened gasping for breath, coughing, choking: No   Change in weight:  Yes, gained 10 pounds  Morning headaches:  No   Awaken with a sore throat or dry mouth:  No   Leg jerking at night:  No   Creepy crawly feeling in legs/urge to move legs: Neuropathy, managed by outside provider  Teeth grinding: No   Have you ever awakened at night with a sour taste or burning sensation in your chest:  No   Do you have muscle weakness with laughing or anger:  nO   Have you ever felt paralyzed while going to sleep or waking up:  No   Sleepwalking: No   Nightmares: No   Nocturia (urination at night): 0-1 times per night  Memory Problem: No  "    Medical Conditions (PMH):   Hypertension  Hyperlipidemia  Palpitations  Type 2 diabetes      Social history:  Do you drive a commercial vehicle:  No   Shift work:  Yes   Tobacco use:  No   Alcohol use: 0 per week  Caffeinated drinks: 1-2 per day  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 RN    Family History (parents and siblings) (pertaining to sleep medicine):  Sleep apnea  Thyroid disorder  Sleep terrors    Medications: reviewed    Allergies:  Fenofibrate micronized, Oxycodone-acetaminophen, and Measles mumps and rubella virus vaccine live      REVIEW OF SYSTEMS:  Pertinent positive symptoms are:  Soft snoring  Powell Sleepiness Scale of Total score: 6   Fatigue  Palpitations        PHYSICAL EXAM:  CONSTITUTINONAL:   Vitals:    04/09/24 1300   BP: 135/58   Pulse: 72   SpO2: 93%   Weight: 84.5 kg (186 lb 3.2 oz)   Height: 157.5 cm (62.01\")    Body mass index is 34.05 kg/m².   HEAD: atraumatic, normocephalic   THROAT: tonsils are surgically absent, Mallampati class III  NECK: Neck Circumference: 15 inches, trachea is midline  RESPIRATORY SYSTEM: Respirations even, unlabored, normal rate  CARDIOVASULAR SYSTEM: Normal rate  NEUROLOGICAL SYSTEM: Alert and oriented x 3  PSYCHIATRIC SYSTEM: Mood is normal/ appropriate     Office note(s) from care team reviewed. Office note(s) reviewed: 3/18/2024 cardiology note    Labs/ Test Results Reviewed:  TSH          5/3/2023    09:52 11/15/2023    11:49   TSH   TSH 2.900  2.020       Most Recent A1C          11/15/2023    11:49   HGBA1C Most Recent   Hemoglobin A1C 6.00    Also reviewed 2/2024 stress test, 3/2024 echo            ASSESSMENT AND PLAN:   Suspected sleep apnea: patient's symptoms and physical examination are concerning for possible sleep apnea.   I discussed the signs, symptoms, and pathophysiology of sleep apnea with this patient.  I also discussed the possible complications of untreated sleep apnea including but not limited to potential risk of resistant hypertension, insulin " resistance, pulmonary hypertension, atrial fibrillation or other arrhythmias, heart attack, stroke, nonrestorative sleep with hypersomnia which can increase risk for motor vehicle accidents, etc.   Different testing methods including home-based and lab based sleep studies were discussed with this patient.   Based on patient history and physical examination, will proceed with home sleep study.  The order for the sleep study is placed in [facility].  The test will be scheduled after prior authorization has been obtained through patient's insurance.  Discussed overview of treatment options for sleep apnea in the office today including PAP therapy and oral mandibular advancement device, and treatment/ management will be discussed in more detail with this patient after the test is completed.  All questions were answered to patient's satisfaction.   Snoring: snoring is the sound created by turbulent airflow vibrating upper airway soft tissue.  I have also discussed factors affecting snoring including sleep deprivation, sleeping on the back and alcohol ingestion. To minimize snoring, patient is advised to have adequate sleep, sleep on their side, and avoid alcohol and sedative medications around bedtime.   Obesity: Body mass index is 34.05 kg/m².. Patients who are overweight or obese are at increased risk of sleep apnea/ sleep disordered breathing. Weight reduction and healthy lifestyle are encouraged in overweight/ obese patients as part of a comprehensive approach to sleep apnea treatment.    Hypertension  Palpitations: Cardiology evaluating  Diabetes  Shiftwork    Patient will follow-up after study, 31 to 90 days after PAP therapy initiated if applicable, or contact the office sooner for questions or concerns. Patient's questions were answered.            Thank you again for asking me to consult on this patient.  Please do not hesitate to call me if you have additional questions or concerns.       Kasia Odom DNP,  BROOK  [facility]

## 2024-05-10 ENCOUNTER — LAB (OUTPATIENT)
Dept: LAB | Facility: HOSPITAL | Age: 59
End: 2024-05-10
Payer: COMMERCIAL

## 2024-05-10 ENCOUNTER — HOSPITAL ENCOUNTER (OUTPATIENT)
Dept: SLEEP MEDICINE | Facility: HOSPITAL | Age: 59
Discharge: HOME | End: 2024-05-10
Admitting: NURSE PRACTITIONER
Payer: COMMERCIAL

## 2024-05-10 DIAGNOSIS — F41.9 ANXIETY: ICD-10-CM

## 2024-05-10 DIAGNOSIS — R29.818 SUSPECTED SLEEP APNEA: ICD-10-CM

## 2024-05-10 DIAGNOSIS — I10 BENIGN ESSENTIAL HYPERTENSION: ICD-10-CM

## 2024-05-10 DIAGNOSIS — E78.1 HYPERTRIGLYCERIDEMIA: ICD-10-CM

## 2024-05-10 DIAGNOSIS — G57.93 NEUROPATHIC PAIN OF BOTH FEET: ICD-10-CM

## 2024-05-10 DIAGNOSIS — E11.9 CONTROLLED TYPE 2 DIABETES MELLITUS WITHOUT COMPLICATION, WITHOUT LONG-TERM CURRENT USE OF INSULIN: ICD-10-CM

## 2024-05-10 DIAGNOSIS — E66.9 CLASS 1 OBESITY WITH BODY MASS INDEX (BMI) OF 34.0 TO 34.9 IN ADULT, UNSPECIFIED OBESITY TYPE, UNSPECIFIED WHETHER SERIOUS COMORBIDITY PRESENT: ICD-10-CM

## 2024-05-10 LAB
ANION GAP SERPL CALCULATED.3IONS-SCNC: 11.9 MMOL/L (ref 5–15)
BUN SERPL-MCNC: 12 MG/DL (ref 6–20)
BUN/CREAT SERPL: 17.9 (ref 7–25)
CALCIUM SPEC-SCNC: 9.6 MG/DL (ref 8.6–10.5)
CHLORIDE SERPL-SCNC: 98 MMOL/L (ref 98–107)
CHOLEST SERPL-MCNC: 126 MG/DL (ref 0–200)
CO2 SERPL-SCNC: 23.1 MMOL/L (ref 22–29)
CREAT SERPL-MCNC: 0.67 MG/DL (ref 0.57–1)
EGFRCR SERPLBLD CKD-EPI 2021: 102.1 ML/MIN/1.73
GLUCOSE SERPL-MCNC: 122 MG/DL (ref 65–99)
HBA1C MFR BLD: 6.6 % (ref 4.8–5.6)
HDLC SERPL-MCNC: 24 MG/DL (ref 40–60)
LDLC SERPL CALC-MCNC: 45 MG/DL (ref 0–100)
LDLC/HDLC SERPL: 1.08 {RATIO}
POTASSIUM SERPL-SCNC: 4.8 MMOL/L (ref 3.5–5.2)
SODIUM SERPL-SCNC: 133 MMOL/L (ref 136–145)
TRIGL SERPL-MCNC: 381 MG/DL (ref 0–150)
VLDLC SERPL-MCNC: 57 MG/DL (ref 5–40)

## 2024-05-10 PROCEDURE — 83036 HEMOGLOBIN GLYCOSYLATED A1C: CPT

## 2024-05-10 PROCEDURE — 80061 LIPID PANEL: CPT

## 2024-05-10 PROCEDURE — 80048 BASIC METABOLIC PNL TOTAL CA: CPT

## 2024-05-10 PROCEDURE — 36415 COLL VENOUS BLD VENIPUNCTURE: CPT

## 2024-05-13 ENCOUNTER — TELEPHONE (OUTPATIENT)
Dept: CARDIOLOGY | Facility: CLINIC | Age: 59
End: 2024-05-13
Payer: COMMERCIAL

## 2024-05-13 NOTE — TELEPHONE ENCOUNTER
, Regina Britt, BROOK Christie, Ranjana, Pj Rep  Renal function and electrolytes are stable, lipid panel is slightly improved, continue current meds    lmvm

## 2024-05-15 ENCOUNTER — OFFICE VISIT (OUTPATIENT)
Dept: CARDIOLOGY | Facility: CLINIC | Age: 59
End: 2024-05-15
Payer: COMMERCIAL

## 2024-05-15 VITALS
HEART RATE: 76 BPM | HEIGHT: 62 IN | SYSTOLIC BLOOD PRESSURE: 140 MMHG | WEIGHT: 185.8 LBS | DIASTOLIC BLOOD PRESSURE: 63 MMHG | BODY MASS INDEX: 34.19 KG/M2

## 2024-05-15 DIAGNOSIS — I10 BENIGN ESSENTIAL HYPERTENSION: Primary | Chronic | ICD-10-CM

## 2024-05-15 DIAGNOSIS — R07.89 CHEST PAIN, ATYPICAL: ICD-10-CM

## 2024-05-15 DIAGNOSIS — R00.2 PALPITATIONS: ICD-10-CM

## 2024-05-15 PROCEDURE — 99214 OFFICE O/P EST MOD 30 MIN: CPT | Performed by: INTERNAL MEDICINE

## 2024-05-15 NOTE — PROGRESS NOTES
Chief Complaint  Follow-up (BP Check)    Subjective    Patient following up today stable symptom wise still with issues of fatigue especially with walking up stairs or doing certain activities present necessarily for out of breath.  She does not have any anginal chest discomfort  Past Medical History:   Diagnosis Date    Allergic     Arrhythmia     Hu's palsy     Diabetes mellitus     Hyperlipidemia     Hypertension     Kidney stone          Current Outpatient Medications:     acetaminophen (TYLENOL) 325 MG tablet, Take 1 tablet by mouth Every 4 (Four) Hours As Needed., Disp: , Rfl:     ACIDOPHILUS LACTOBACILLUS PO, Take  by mouth., Disp: , Rfl:     albuterol sulfate  (90 Base) MCG/ACT inhaler, , Disp: , Rfl:     amLODIPine-Valsartan-HCTZ 5-160-25 MG tablet, One tablet PO q24h, Disp: 90 tablet, Rfl: 3    aspirin 81 MG EC tablet, Take 1 tablet by mouth Daily., Disp: 90 tablet, Rfl: 3    atorvastatin (Lipitor) 20 MG tablet, Take 1 tablet by mouth Every Night., Disp: 90 tablet, Rfl: 3    CETIRIZINE HCL PO, Take  by mouth., Disp: , Rfl:     Cholecalciferol (VITAMIN D-3 PO), Take  by mouth., Disp: , Rfl:     ESTRADIOL PO, Take 5 mcg by mouth. QD, Disp: , Rfl:     gabapentin (NEURONTIN) 300 MG capsule, Take 1 capsule by mouth 2 (Two) Times a Day., Disp: 180 capsule, Rfl: 1    MAGNESIUM PO, Take  by mouth. Magnesium hydroxide 380mg w/ 150mg vitamin c, Disp: , Rfl:     metFORMIN ER (GLUCOPHAGE-XR) 500 MG 24 hr tablet, Take 2 tablets by mouth 2 (Two) Times a Day With Meals., Disp: 360 tablet, Rfl: 3    metoprolol tartrate (LOPRESSOR) 25 MG tablet, TAKE 1 TABLET BY MOUTH TWICE A DAY, Disp: 180 tablet, Rfl: 3    multivitamin with minerals tablet tablet, Take 1 tablet by mouth Daily., Disp: , Rfl:     Omega-3 Fatty Acids (FISH OIL OMEGA-3 PO), Take  by mouth., Disp: , Rfl:     There are no discontinued medications.  Allergies   Allergen Reactions    Fenofibrate Micronized Other (See Comments)     Elevated liver enzymes  "   Oxycodone-Acetaminophen Itching    Measles Mumps And Rubella Virus Vaccine Live Other (See Comments)     Mump like symptoms        Social History     Tobacco Use    Smoking status: Never     Passive exposure: Never    Smokeless tobacco: Never   Vaping Use    Vaping status: Never Used   Substance Use Topics    Alcohol use: Never    Drug use: Never       Family History   Problem Relation Age of Onset    Hypertension Mother     Thyroid disease Mother     Atrial fibrillation Mother     Arthritis Mother     Cancer Mother     Depression Mother     Hyperlipidemia Mother     Arrhythmia Mother     Sleep apnea Mother     Hyperlipidemia Father     Stroke Father     Diabetes Father     Heart disease Father     Arthritis Father     Cancer Father     Arrhythmia Father     Sleep disorder Father         Night Terrors    Sleep apnea Father     Cancer Sister     Depression Sister     Arrhythmia Sister     Sleep apnea Sister     Kidney disease Maternal Grandmother     Cancer Maternal Grandfather     Diabetes Paternal Grandmother     Cancer Paternal Grandfather     COPD Paternal Grandfather         Objective     /63 (BP Location: Left arm, Patient Position: Sitting, Cuff Size: Adult)   Pulse 76   Ht 157.5 cm (62.01\")   Wt 84.3 kg (185 lb 12.8 oz)   BMI 33.97 kg/m²       Physical Exam    General Appearance:   no acute distress  Alert and oriented x3  HENT:   lips not cyanotic  Atraumatic  Neck:  No jvd   supple  Respiratory:  no respiratory distress  normal breath sounds  no rales  Cardiovascular:  Regular rate and rhythm  no S3, no S4   no murmur  no rub  Extremities  No cyanosis  lower extremity edema: none    Skin:   warm, dry  No rashes      Result Review :     proBNP   Date Value Ref Range Status   11/27/2023 <36.0 0.0 - 900.0 pg/mL Final     CMP          11/27/2023    11:39 3/18/2024    12:44 5/10/2024    09:52   CMP   Glucose 122  119  122    BUN 11  10  12    Creatinine 0.59  0.67  0.67    EGFR 105.3  102.1  102.1  " "  Sodium 132  135  133    Potassium 4.2  4.1  4.8    Chloride 96  94  98    Calcium 10.3  10.6  9.6    Total Protein 7.1      Albumin 4.5      Globulin 2.6      Total Bilirubin 0.3      Alkaline Phosphatase 45      AST (SGOT) 21      ALT (SGPT) 24      Albumin/Globulin Ratio 1.7      BUN/Creatinine Ratio 18.6  14.9  17.9    Anion Gap 13.4  13.3  11.9      CBC w/diff          11/27/2023    11:39   CBC w/Diff   WBC 8.36    RBC 4.73    Hemoglobin 13.8    Hematocrit 40.7    MCV 86.0    MCH 29.2    MCHC 33.9    RDW 12.8    Platelets 315    Neutrophil Rel % 63.9    Immature Granulocyte Rel % 0.2    Lymphocyte Rel % 25.7    Monocyte Rel % 4.5    Eosinophil Rel % 4.7    Basophil Rel % 1.0       Lab Results   Component Value Date    TSH 2.020 11/15/2023      Lab Results   Component Value Date    FREET4 1.08 11/15/2023      No results found for: \"DDIMERQUANT\"  Magnesium   Date Value Ref Range Status   11/27/2023 1.9 1.6 - 2.6 mg/dL Final      No results found for: \"DIGOXIN\"   Lab Results   Component Value Date    TROPONINT <6 11/27/2023          Lab 05/10/24  0952   HEMOGLOBIN A1C 6.60*      Lipid Panel          11/15/2023    11:49 5/10/2024    09:52   Lipid Panel   Total Cholesterol 165  126    Triglycerides 431  381    HDL Cholesterol 28  24    VLDL Cholesterol 68  57    LDL Cholesterol  69  45    LDL/HDL Ratio 1.81  1.08      No results found for: \"POCTROP\"  Results for orders placed during the hospital encounter of 02/21/24    Stress Test With Myocardial Perfusion One Day    Interpretation Summary    Myocardial perfusion imaging indicates a normal myocardial perfusion study with no evidence of ischemia. Impressions are consistent with a low risk study.    Left ventricular ejection fraction is normal (Calculated EF = 66%).    Findings consistent with a normal ECG stress test.    Results for orders placed during the hospital encounter of 03/08/24    Adult Transthoracic Echo Complete W/ Cont if Necessary Per " Protocol    Interpretation Summary    Left ventricular systolic function is normal. Calculated left ventricular EF = 66.1%    Left ventricular diastolic function was normal.                 Diagnoses and all orders for this visit:    1. Benign essential hypertension (Primary)  Assessment & Plan:  Blood sugar appears to be reasonable recommend continue with her current regimen of Exforge HCTZ 5/160/25 daily and continue to monitor blood pressures at home.        2. Chest pain, atypical  Assessment & Plan:  No proximal ischemia seen on stress test suspect likely muscle skeletal in nature      3. Palpitations  Assessment & Plan:  Stable given her symptoms of fatigue would not recommend further titration of metoprolol at this point encourage exercise she is also currently being worked up for sleep apnea              Follow Up     Return in about 6 months (around 11/15/2024) for Follow with Regina Gambino.          Patient was given instructions and counseling regarding her condition or for health maintenance advice. Please see specific information pulled into the AVS if appropriate.

## 2024-05-15 NOTE — ASSESSMENT & PLAN NOTE
Blood sugar appears to be reasonable recommend continue with her current regimen of Exforge HCTZ 5/160/25 daily and continue to monitor blood pressures at home.

## 2024-05-15 NOTE — ASSESSMENT & PLAN NOTE
Stable given her symptoms of fatigue would not recommend further titration of metoprolol at this point encourage exercise she is also currently being worked up for sleep apnea

## 2024-05-16 ENCOUNTER — OFFICE VISIT (OUTPATIENT)
Dept: FAMILY MEDICINE CLINIC | Facility: CLINIC | Age: 59
End: 2024-05-16
Payer: COMMERCIAL

## 2024-05-16 VITALS
TEMPERATURE: 97.2 F | RESPIRATION RATE: 18 BRPM | HEART RATE: 71 BPM | DIASTOLIC BLOOD PRESSURE: 62 MMHG | OXYGEN SATURATION: 99 % | HEIGHT: 62 IN | BODY MASS INDEX: 34.52 KG/M2 | SYSTOLIC BLOOD PRESSURE: 125 MMHG | WEIGHT: 187.6 LBS

## 2024-05-16 DIAGNOSIS — I10 BENIGN ESSENTIAL HYPERTENSION: Chronic | ICD-10-CM

## 2024-05-16 DIAGNOSIS — E11.610 CONTROLLED TYPE 2 DIABETES MELLITUS WITH DIABETIC NEUROPATHIC ARTHROPATHY, WITHOUT LONG-TERM CURRENT USE OF INSULIN: Chronic | ICD-10-CM

## 2024-05-16 DIAGNOSIS — Z00.00 PHYSICAL EXAM, ANNUAL: Primary | ICD-10-CM

## 2024-05-16 DIAGNOSIS — E11.42 DIABETIC POLYNEUROPATHY ASSOCIATED WITH TYPE 2 DIABETES MELLITUS: Chronic | ICD-10-CM

## 2024-05-16 PROCEDURE — 99214 OFFICE O/P EST MOD 30 MIN: CPT | Performed by: FAMILY MEDICINE

## 2024-05-16 RX ORDER — GABAPENTIN 300 MG/1
300 CAPSULE ORAL 3 TIMES DAILY
Qty: 270 CAPSULE | Refills: 1 | Status: SHIPPED | OUTPATIENT
Start: 2024-05-16

## 2024-05-16 RX ORDER — METFORMIN HYDROCHLORIDE 500 MG/1
1000 TABLET, EXTENDED RELEASE ORAL 2 TIMES DAILY WITH MEALS
Qty: 360 TABLET | Refills: 3 | Status: SHIPPED | OUTPATIENT
Start: 2024-05-16

## 2024-05-16 RX ORDER — ATORVASTATIN CALCIUM 20 MG/1
20 TABLET, FILM COATED ORAL NIGHTLY
Qty: 90 TABLET | Refills: 3 | Status: SHIPPED | OUTPATIENT
Start: 2024-05-16

## 2024-05-16 NOTE — PROGRESS NOTES
"Chief Complaint  Med Refill and Annual Exam    Subjective          Johnna Islas presents to [facility]  History of Present Illness    Patient presents for annual exam and medication refills blood pressure goal less than 140/90    Managing diabetes on metformin taking medicine as prescribed for neuropathy    Consider changing medicine if still having fatigue         Objective   Vital Signs:   /62 (BP Location: Right arm, Patient Position: Sitting, Cuff Size: Adult)   Pulse 71   Temp 97.2 °F (36.2 °C)   Resp 18   Ht 157.5 cm (62\")   Wt 85.1 kg (187 lb 9.6 oz)   SpO2 99%   BMI 34.31 kg/m²     BMI is >= 30 and <35. (Class 1 Obesity). The following options were offered after discussion;: exercise counseling/recommendations      Physical Exam  Vitals reviewed.   Constitutional:       Appearance: Normal appearance. She is well-developed.   HENT:      Head: Normocephalic and atraumatic.      Right Ear: External ear normal.      Left Ear: External ear normal.      Nose: Nose normal.   Eyes:      Conjunctiva/sclera: Conjunctivae normal.      Pupils: Pupils are equal, round, and reactive to light.   Cardiovascular:      Rate and Rhythm: Normal rate.   Pulmonary:      Effort: Pulmonary effort is normal.      Breath sounds: Normal breath sounds.   Abdominal:      General: There is no distension.   Skin:     General: Skin is warm and dry.   Neurological:      Mental Status: She is alert and oriented to person, place, and time. Mental status is at baseline.   Psychiatric:         Mood and Affect: Mood and affect normal.         Behavior: Behavior normal.         Thought Content: Thought content normal.         Judgment: Judgment normal.          Result Review :   The following data was reviewed by: Robson Jasso DO on 05/16/2024:  Common labs          11/27/2023    11:39 3/18/2024    12:44 5/10/2024    09:52   Common Labs   Glucose 122  119  122    BUN 11  10  12    Creatinine " 0.59  0.67  0.67    Sodium 132  135  133    Potassium 4.2  4.1  4.8    Chloride 96  94  98    Calcium 10.3  10.6  9.6    Albumin 4.5      Total Bilirubin 0.3      Alkaline Phosphatase 45      AST (SGOT) 21      ALT (SGPT) 24      WBC 8.36      Hemoglobin 13.8      Hematocrit 40.7      Platelets 315      Total Cholesterol   126    Triglycerides   381    HDL Cholesterol   24    LDL Cholesterol    45    Hemoglobin A1C   6.60                    Assessment and Plan    Diagnoses and all orders for this visit:    1. Physical exam, annual (Primary)    2. Diabetic polyneuropathy associated with type 2 diabetes mellitus  -     gabapentin (NEURONTIN) 300 MG capsule; Take 1 capsule by mouth 3 (Three) Times a Day.  Dispense: 270 capsule; Refill: 1    3. Controlled type 2 diabetes mellitus with diabetic neuropathic arthropathy, without long-term current use of insulin  -     metFORMIN ER (GLUCOPHAGE-XR) 500 MG 24 hr tablet; Take 2 tablets by mouth 2 (Two) Times a Day With Meals.  Dispense: 360 tablet; Refill: 3    4. Benign essential hypertension    Other orders  -     atorvastatin (Lipitor) 20 MG tablet; Take 1 tablet by mouth Every Night.  Dispense: 90 tablet; Refill: 3  -     metoprolol tartrate (LOPRESSOR) 25 MG tablet; Take 1 tablet by mouth 2 (Two) Times a Day.  Dispense: 180 tablet; Refill: 3      Reviewed chronic conditions, age risk factors and will order labs today to manage, screen and follow up at least every 12 months    Recommend appropriate cancer screens that are age appropriate unless already previously performed or not currently due to be repeated    Recommend wearing sunscreen and following up on any concerning skin conditions or lesions, wearing seat belts and other risk reduction measures to lessen incident of death, disease or other   Continue to monitor and manage weight, goal BMI approaching 30, calorie restriction <4209-9161 and activity up to 150 min a week suggested to help get with weight  loss  Counseled on risks, disease and advice given on screening and continued management of health and condition through the next year until next physical     Medications refilled and reviewed with labs additionally for patient follow-up at least every 6 months        Follow Up   Return in about 6 months (around 11/16/2024), or if symptoms worsen or fail to improve, for Next scheduled follow up, Recheck.  Patient was given instructions and counseling regarding her condition or for health maintenance advice. Please see specific information pulled into the AVS if appropriate.     Transcribed from ambient dictation for Robson Jasso DO by Robson Jasso DO.  05/16/24   08:30 EDT

## 2024-05-20 DIAGNOSIS — G47.33 OSA (OBSTRUCTIVE SLEEP APNEA): Primary | ICD-10-CM

## 2024-05-20 DIAGNOSIS — R06.83 SNORING: ICD-10-CM

## 2024-05-22 ENCOUNTER — TELEPHONE (OUTPATIENT)
Dept: SLEEP MEDICINE | Facility: HOSPITAL | Age: 59
End: 2024-05-22
Payer: COMMERCIAL

## 2024-06-06 ENCOUNTER — TELEPHONE (OUTPATIENT)
Dept: SLEEP MEDICINE | Facility: HOSPITAL | Age: 59
End: 2024-06-06
Payer: COMMERCIAL

## 2024-10-01 ENCOUNTER — OFFICE VISIT (OUTPATIENT)
Dept: FAMILY MEDICINE CLINIC | Facility: CLINIC | Age: 59
End: 2024-10-01
Payer: COMMERCIAL

## 2024-10-01 VITALS
HEIGHT: 62 IN | WEIGHT: 181.6 LBS | BODY MASS INDEX: 33.42 KG/M2 | TEMPERATURE: 97.4 F | DIASTOLIC BLOOD PRESSURE: 64 MMHG | OXYGEN SATURATION: 100 % | SYSTOLIC BLOOD PRESSURE: 145 MMHG | HEART RATE: 66 BPM | RESPIRATION RATE: 16 BRPM

## 2024-10-01 DIAGNOSIS — J06.9 VIRAL UPPER RESPIRATORY TRACT INFECTION: Primary | ICD-10-CM

## 2024-10-01 LAB
EXPIRATION DATE: NORMAL
EXPIRATION DATE: NORMAL
FLUAV AG UPPER RESP QL IA.RAPID: NOT DETECTED
FLUBV AG UPPER RESP QL IA.RAPID: NOT DETECTED
INTERNAL CONTROL: NORMAL
INTERNAL CONTROL: NORMAL
Lab: 111
Lab: 111
S PYO AG THROAT QL: NEGATIVE
SARS-COV-2 AG UPPER RESP QL IA.RAPID: NOT DETECTED

## 2024-10-01 PROCEDURE — 87428 SARSCOV & INF VIR A&B AG IA: CPT | Performed by: FAMILY MEDICINE

## 2024-10-01 PROCEDURE — 87880 STREP A ASSAY W/OPTIC: CPT | Performed by: FAMILY MEDICINE

## 2024-10-01 PROCEDURE — 99213 OFFICE O/P EST LOW 20 MIN: CPT | Performed by: FAMILY MEDICINE

## 2024-10-01 RX ORDER — PREDNISONE 5 MG/1
5 TABLET ORAL DAILY
Qty: 10 TABLET | Refills: 0 | Status: SHIPPED | OUTPATIENT
Start: 2024-10-01

## 2024-10-01 RX ORDER — AZITHROMYCIN 250 MG/1
TABLET, FILM COATED ORAL
Qty: 6 TABLET | Refills: 0 | Status: SHIPPED | OUTPATIENT
Start: 2024-10-01

## 2024-10-01 RX ORDER — PROMETHAZINE HYDROCHLORIDE 25 MG/1
25 TABLET ORAL EVERY 6 HOURS PRN
Qty: 20 TABLET | Refills: 0 | Status: SHIPPED | OUTPATIENT
Start: 2024-10-01

## 2024-10-01 NOTE — PROGRESS NOTES
"Chief Complaint  Fever, Vomiting, Headache, and Illness (Since Saturday. )    Subjective          Johnna Islas presents to [facility]  Fever   Associated symptoms include headaches and vomiting.   Vomiting   Associated symptoms include a fever and headaches.   Headache  Illness  Associated symptoms include a fever, headaches and vomiting.       Patient presents with symptoms of vomiting headache fever illness since Saturday sick contacts noted overall not feeling better sinus pain pressure additionally    Objective   Vital Signs:   /64 (BP Location: Left arm, Patient Position: Sitting, Cuff Size: Adult)   Pulse 66   Temp 97.4 °F (36.3 °C)   Resp 16   Ht 157.5 cm (62\")   Wt 82.4 kg (181 lb 9.6 oz)   SpO2 100%   BMI 33.22 kg/m²            Physical Exam  Vitals reviewed.   Constitutional:       Appearance: She is well-developed. She is ill-appearing.   HENT:      Head: Normocephalic and atraumatic.      Right Ear: External ear normal.      Left Ear: External ear normal.      Nose: Congestion and rhinorrhea present.   Eyes:      Conjunctiva/sclera: Conjunctivae normal.      Pupils: Pupils are equal, round, and reactive to light.   Cardiovascular:      Rate and Rhythm: Normal rate.   Pulmonary:      Effort: Pulmonary effort is normal.      Breath sounds: Normal breath sounds.   Abdominal:      General: There is no distension.   Skin:     General: Skin is warm and dry.   Neurological:      General: No focal deficit present.      Mental Status: She is alert and oriented to person, place, and time.   Psychiatric:         Mood and Affect: Mood and affect normal.         Behavior: Behavior normal.         Thought Content: Thought content normal.         Judgment: Judgment normal.          Result Review :   The following data was reviewed by: Robson Jasso DO on 10/01/2024:  Common labs          11/27/2023    11:39 3/18/2024    12:44 5/10/2024    09:52   Common Labs "   Glucose 122  119  122    BUN 11  10  12    Creatinine 0.59  0.67  0.67    Sodium 132  135  133    Potassium 4.2  4.1  4.8    Chloride 96  94  98    Calcium 10.3  10.6  9.6    Albumin 4.5      Total Bilirubin 0.3      Alkaline Phosphatase 45      AST (SGOT) 21      ALT (SGPT) 24      WBC 8.36      Hemoglobin 13.8      Hematocrit 40.7      Platelets 315      Total Cholesterol   126    Triglycerides   381    HDL Cholesterol   24    LDL Cholesterol    45    Hemoglobin A1C   6.60                    Assessment and Plan    Diagnoses and all orders for this visit:    1. Viral upper respiratory tract infection (Primary)  -     POC Rapid Strep A  -     POCT SARS-CoV-2 + Flu Antigen FELIX    Other orders  -     promethazine (PHENERGAN) 25 MG tablet; Take 1 tablet by mouth Every 6 (Six) Hours As Needed for Nausea or Vomiting.  Dispense: 20 tablet; Refill: 0  -     azithromycin (Zithromax Z-Deven) 250 MG tablet; Take 2 tablets by mouth on day 1, then 1 tablet daily on days 2-5  Dispense: 6 tablet; Refill: 0  -     predniSONE (DELTASONE) 5 MG tablet; Take 1 tablet by mouth Daily.  Dispense: 10 tablet; Refill: 0      Will treat symptoms as above antibiotics prescribed for sinus pain pressure likely sinusitis developing steroids additionally to help with symptom management follow-up no improvement or worse        Follow Up   Return if symptoms worsen or fail to improve, for Next scheduled follow up, Recheck.  Patient was given instructions and counseling regarding her condition or for health maintenance advice. Please see specific information pulled into the AVS if appropriate.     Transcribed from ambient dictation for Robson Jasso DO by Robson Jasso DO.  10/01/24   11:22 EDT

## 2024-10-30 ENCOUNTER — LAB (OUTPATIENT)
Dept: LAB | Facility: HOSPITAL | Age: 59
End: 2024-10-30
Payer: COMMERCIAL

## 2024-10-30 ENCOUNTER — OFFICE VISIT (OUTPATIENT)
Dept: FAMILY MEDICINE CLINIC | Facility: CLINIC | Age: 59
End: 2024-10-30
Payer: COMMERCIAL

## 2024-10-30 VITALS
DIASTOLIC BLOOD PRESSURE: 70 MMHG | BODY MASS INDEX: 34.45 KG/M2 | RESPIRATION RATE: 16 BRPM | HEART RATE: 69 BPM | TEMPERATURE: 97.5 F | SYSTOLIC BLOOD PRESSURE: 137 MMHG | OXYGEN SATURATION: 97 % | HEIGHT: 62 IN | WEIGHT: 187.2 LBS

## 2024-10-30 DIAGNOSIS — Z00.00 PHYSICAL EXAM, ANNUAL: Primary | ICD-10-CM

## 2024-10-30 DIAGNOSIS — G57.93 NEUROPATHIC PAIN OF BOTH FEET: Chronic | ICD-10-CM

## 2024-10-30 DIAGNOSIS — F33.1 MAJOR DEPRESSIVE DISORDER, RECURRENT, MODERATE: ICD-10-CM

## 2024-10-30 DIAGNOSIS — E66.09 CLASS 1 OBESITY DUE TO EXCESS CALORIES WITH SERIOUS COMORBIDITY AND BODY MASS INDEX (BMI) OF 34.0 TO 34.9 IN ADULT: Chronic | ICD-10-CM

## 2024-10-30 DIAGNOSIS — E11.42 DIABETIC POLYNEUROPATHY ASSOCIATED WITH TYPE 2 DIABETES MELLITUS: Chronic | ICD-10-CM

## 2024-10-30 DIAGNOSIS — E66.811 CLASS 1 OBESITY DUE TO EXCESS CALORIES WITH SERIOUS COMORBIDITY AND BODY MASS INDEX (BMI) OF 34.0 TO 34.9 IN ADULT: Chronic | ICD-10-CM

## 2024-10-30 DIAGNOSIS — M81.6 LOCALIZED OSTEOPOROSIS WITHOUT CURRENT PATHOLOGICAL FRACTURE: ICD-10-CM

## 2024-10-30 DIAGNOSIS — I10 BENIGN ESSENTIAL HYPERTENSION: Chronic | ICD-10-CM

## 2024-10-30 DIAGNOSIS — E78.1 HYPERTRIGLYCERIDEMIA: Chronic | ICD-10-CM

## 2024-10-30 DIAGNOSIS — E11.610 CONTROLLED TYPE 2 DIABETES MELLITUS WITH DIABETIC NEUROPATHIC ARTHROPATHY, WITHOUT LONG-TERM CURRENT USE OF INSULIN: Chronic | ICD-10-CM

## 2024-10-30 LAB
ALBUMIN SERPL-MCNC: 4.3 G/DL (ref 3.5–5.2)
ALBUMIN UR-MCNC: 9 MG/DL
ALBUMIN/GLOB SERPL: 1.5 G/DL
ALP SERPL-CCNC: 41 U/L (ref 39–117)
ALT SERPL W P-5'-P-CCNC: 26 U/L (ref 1–33)
ANION GAP SERPL CALCULATED.3IONS-SCNC: 9.6 MMOL/L (ref 5–15)
AST SERPL-CCNC: 27 U/L (ref 1–32)
BILIRUB SERPL-MCNC: 0.3 MG/DL (ref 0–1.2)
BUN SERPL-MCNC: 13 MG/DL (ref 6–20)
BUN/CREAT SERPL: 18.3 (ref 7–25)
CALCIUM SPEC-SCNC: 10.3 MG/DL (ref 8.6–10.5)
CHLORIDE SERPL-SCNC: 95 MMOL/L (ref 98–107)
CHOLEST SERPL-MCNC: 163 MG/DL (ref 0–200)
CO2 SERPL-SCNC: 26.4 MMOL/L (ref 22–29)
CREAT SERPL-MCNC: 0.71 MG/DL (ref 0.57–1)
CREAT UR-MCNC: 179.2 MG/DL
DEPRECATED RDW RBC AUTO: 41.8 FL (ref 37–54)
EGFRCR SERPLBLD CKD-EPI 2021: 98.7 ML/MIN/1.73
ERYTHROCYTE [DISTWIDTH] IN BLOOD BY AUTOMATED COUNT: 12.8 % (ref 12.3–15.4)
GLOBULIN UR ELPH-MCNC: 2.8 GM/DL
GLUCOSE SERPL-MCNC: 133 MG/DL (ref 65–99)
HBA1C MFR BLD: 6.2 % (ref 4.8–5.6)
HCT VFR BLD AUTO: 36 % (ref 34–46.6)
HDLC SERPL-MCNC: 23 MG/DL (ref 40–60)
HGB BLD-MCNC: 12.9 G/DL (ref 12–15.9)
INR PPP: 0.89 (ref 0.86–1.15)
LDLC SERPL CALC-MCNC: 45 MG/DL (ref 0–100)
LDLC/HDLC SERPL: 0.3 {RATIO}
MCH RBC QN AUTO: 31.9 PG (ref 26.6–33)
MCHC RBC AUTO-ENTMCNC: 35.8 G/DL (ref 31.5–35.7)
MCV RBC AUTO: 89.1 FL (ref 79–97)
MICROALBUMIN/CREAT UR: 50.2 MG/G (ref 0–29)
PLATELET # BLD AUTO: 288 10*3/MM3 (ref 140–450)
PMV BLD AUTO: 9.5 FL (ref 6–12)
POTASSIUM SERPL-SCNC: 4.3 MMOL/L (ref 3.5–5.2)
PROT SERPL-MCNC: 7.1 G/DL (ref 6–8.5)
PROTHROMBIN TIME: 12.3 SECONDS (ref 11.8–14.9)
RBC # BLD AUTO: 4.04 10*6/MM3 (ref 3.77–5.28)
SODIUM SERPL-SCNC: 131 MMOL/L (ref 136–145)
T4 FREE SERPL-MCNC: 1.07 NG/DL (ref 0.92–1.68)
TRIGL SERPL-MCNC: 666 MG/DL (ref 0–150)
TSH SERPL DL<=0.05 MIU/L-ACNC: 2.34 UIU/ML (ref 0.27–4.2)
VLDLC SERPL-MCNC: 95 MG/DL (ref 5–40)
WBC NRBC COR # BLD AUTO: 7.06 10*3/MM3 (ref 3.4–10.8)

## 2024-10-30 PROCEDURE — 36415 COLL VENOUS BLD VENIPUNCTURE: CPT

## 2024-10-30 PROCEDURE — 83036 HEMOGLOBIN GLYCOSYLATED A1C: CPT

## 2024-10-30 PROCEDURE — 82570 ASSAY OF URINE CREATININE: CPT | Performed by: FAMILY MEDICINE

## 2024-10-30 PROCEDURE — 80050 GENERAL HEALTH PANEL: CPT

## 2024-10-30 PROCEDURE — 99396 PREV VISIT EST AGE 40-64: CPT | Performed by: FAMILY MEDICINE

## 2024-10-30 PROCEDURE — 80061 LIPID PANEL: CPT

## 2024-10-30 PROCEDURE — 84439 ASSAY OF FREE THYROXINE: CPT

## 2024-10-30 PROCEDURE — 82043 UR ALBUMIN QUANTITATIVE: CPT | Performed by: FAMILY MEDICINE

## 2024-10-30 PROCEDURE — 85610 PROTHROMBIN TIME: CPT

## 2024-10-30 RX ORDER — METFORMIN HYDROCHLORIDE 500 MG/1
1000 TABLET, EXTENDED RELEASE ORAL 2 TIMES DAILY WITH MEALS
Qty: 360 TABLET | Refills: 3 | Status: SHIPPED | OUTPATIENT
Start: 2024-10-30

## 2024-10-30 RX ORDER — GABAPENTIN 300 MG/1
300 CAPSULE ORAL 3 TIMES DAILY
Qty: 270 CAPSULE | Refills: 1 | Status: SHIPPED | OUTPATIENT
Start: 2024-10-30

## 2024-10-30 RX ORDER — METOPROLOL TARTRATE 25 MG/1
25 TABLET, FILM COATED ORAL 2 TIMES DAILY
Qty: 180 TABLET | Refills: 3 | Status: SHIPPED | OUTPATIENT
Start: 2024-10-30

## 2024-10-30 NOTE — PROGRESS NOTES
"Chief Complaint  Annual Exam (No concerns at this time. )    Subjective          Johnna Islas presents to [facility]  History of Present Illness    Patient presents to follow-up on chronic conditions has diabetes neuropathy on gabapentin last A1c 6.6 on 5/2024 fairly well-controlled on metformin needs labs rechecked today.  Her lipid panel on 5/2024 showed hypertriglyceridemia has been as high as 497 last was 381    She takes amlodipine valsartan HCTZ as well as metoprolol for blood pressure at goal less than 140/90, Lipitor for hyperlipidemia hypertriglyceridemia, on gabapentin 300 mg 3 times daily for neuropathy manage well-controlled    Objective   Vital Signs:   /70 (BP Location: Left arm, Patient Position: Sitting, Cuff Size: Adult)   Pulse 69   Temp 97.5 °F (36.4 °C)   Resp 16   Ht 157.5 cm (62\")   Wt 84.9 kg (187 lb 3.2 oz)   SpO2 97%   BMI 34.24 kg/m²            Physical Exam  Vitals reviewed.   Constitutional:       Appearance: Normal appearance. She is well-developed.   HENT:      Head: Normocephalic and atraumatic.      Right Ear: External ear normal.      Left Ear: External ear normal.      Nose: Nose normal.   Eyes:      Conjunctiva/sclera: Conjunctivae normal.      Pupils: Pupils are equal, round, and reactive to light.   Cardiovascular:      Rate and Rhythm: Normal rate.   Pulmonary:      Effort: Pulmonary effort is normal.      Breath sounds: Normal breath sounds.   Abdominal:      General: There is no distension.   Skin:     General: Skin is warm and dry.   Neurological:      Mental Status: She is alert and oriented to person, place, and time. Mental status is at baseline.   Psychiatric:         Mood and Affect: Mood and affect normal.         Behavior: Behavior normal.         Thought Content: Thought content normal.         Judgment: Judgment normal.          Result Review :   The following data was reviewed by: Robson Jasso DO on " 10/30/2024:  Common labs          11/27/2023    11:39 3/18/2024    12:44 5/10/2024    09:52   Common Labs   Glucose 122  119  122    BUN 11  10  12    Creatinine 0.59  0.67  0.67    Sodium 132  135  133    Potassium 4.2  4.1  4.8    Chloride 96  94  98    Calcium 10.3  10.6  9.6    Albumin 4.5      Total Bilirubin 0.3      Alkaline Phosphatase 45      AST (SGOT) 21      ALT (SGPT) 24      WBC 8.36      Hemoglobin 13.8      Hematocrit 40.7      Platelets 315      Total Cholesterol   126    Triglycerides   381    HDL Cholesterol   24    LDL Cholesterol    45    Hemoglobin A1C   6.60                    Assessment and Plan    Diagnoses and all orders for this visit:    1. Physical exam, annual (Primary)    2. Diabetic polyneuropathy associated with type 2 diabetes mellitus  -     gabapentin (NEURONTIN) 300 MG capsule; Take 1 capsule by mouth 3 (Three) Times a Day.  Dispense: 270 capsule; Refill: 1  -     Lipid Panel; Future  -     Hemoglobin A1c; Future  -     Comprehensive Metabolic Panel; Future  -     CBC (No Diff); Future  -     TSH+Free T4; Future  -     Microalbumin / Creatinine Urine Ratio - Urine, Clean Catch  -     Protime-INR; Future    3. Controlled type 2 diabetes mellitus with diabetic neuropathic arthropathy, without long-term current use of insulin  -     metFORMIN ER (GLUCOPHAGE-XR) 500 MG 24 hr tablet; Take 2 tablets by mouth 2 (Two) Times a Day With Meals.  Dispense: 360 tablet; Refill: 3    4. Hypertriglyceridemia    5. Benign essential hypertension    6. Major depressive disorder, recurrent, moderate    7. Neuropathic pain of both feet  -     gabapentin (NEURONTIN) 300 MG capsule; Take 1 capsule by mouth 3 (Three) Times a Day.  Dispense: 270 capsule; Refill: 1    8. Class 1 obesity due to excess calories with serious comorbidity and body mass index (BMI) of 34.0 to 34.9 in adult    Other orders  -     metoprolol tartrate (LOPRESSOR) 25 MG tablet; Take 1 tablet by mouth 2 (Two) Times a Day.  Dispense:  180 tablet; Refill: 3      Reviewed chronic conditions, age risk factors and will order labs today to manage, screen and follow up at least every 12 months    Recommend appropriate cancer screens that are age appropriate unless already previously performed or not currently due to be repeated    Recommend wearing sunscreen and following up on any concerning skin conditions or lesions, wearing seat belts and other risk reduction measures to lessen incident of death, disease or other     Continue to monitor and manage weight, goal BMI approaching 30, calorie restriction <3300-0940 and activity up to 150 min a week suggested to help get with weight loss    Counseled on risks, disease and advice given on screening and continued management of health and condition through the next year until next physical           Follow Up   Return in about 6 months (around 4/30/2025), or if symptoms worsen or fail to improve, for Next scheduled follow up, Recheck, Labs before.  Patient was given instructions and counseling regarding her condition or for health maintenance advice. Please see specific information pulled into the AVS if appropriate.     Transcribed from ambient dictation for Robson Jasso DO by Robson Jasso DO.  10/30/24   09:18 EDT

## 2024-10-31 ENCOUNTER — PATIENT MESSAGE (OUTPATIENT)
Dept: FAMILY MEDICINE CLINIC | Facility: CLINIC | Age: 59
End: 2024-10-31
Payer: COMMERCIAL

## 2024-10-31 DIAGNOSIS — E11.610 CONTROLLED TYPE 2 DIABETES MELLITUS WITH DIABETIC NEUROPATHIC ARTHROPATHY, WITHOUT LONG-TERM CURRENT USE OF INSULIN: Primary | ICD-10-CM

## 2024-10-31 DIAGNOSIS — E11.21 DIABETIC NEPHROPATHY ASSOCIATED WITH TYPE 2 DIABETES MELLITUS: ICD-10-CM

## 2024-10-31 RX ORDER — DAPAGLIFLOZIN 10 MG/1
1 TABLET, FILM COATED ORAL DAILY
Qty: 30 TABLET | Refills: 5 | Status: SHIPPED | OUTPATIENT
Start: 2024-10-31

## 2024-10-31 RX ORDER — ICOSAPENT ETHYL 1 G/1
2 CAPSULE ORAL 2 TIMES DAILY WITH MEALS
Qty: 120 CAPSULE | Refills: 5 | Status: SHIPPED | OUTPATIENT
Start: 2024-10-31

## 2024-10-31 RX ORDER — ATORVASTATIN CALCIUM 20 MG/1
20 TABLET, FILM COATED ORAL NIGHTLY
Qty: 90 TABLET | Refills: 3 | Status: SHIPPED | OUTPATIENT
Start: 2024-10-31

## 2024-11-06 ENCOUNTER — FLU SHOT (OUTPATIENT)
Dept: FAMILY MEDICINE CLINIC | Facility: CLINIC | Age: 59
End: 2024-11-06
Payer: COMMERCIAL

## 2024-11-06 DIAGNOSIS — Z23 NEED FOR INFLUENZA VACCINATION: Primary | ICD-10-CM

## 2024-11-06 PROCEDURE — 90471 IMMUNIZATION ADMIN: CPT | Performed by: FAMILY MEDICINE

## 2024-11-06 PROCEDURE — 90656 IIV3 VACC NO PRSV 0.5 ML IM: CPT | Performed by: FAMILY MEDICINE

## 2024-11-15 ENCOUNTER — OFFICE VISIT (OUTPATIENT)
Dept: CARDIOLOGY | Facility: CLINIC | Age: 59
End: 2024-11-15
Payer: COMMERCIAL

## 2024-11-15 VITALS
SYSTOLIC BLOOD PRESSURE: 136 MMHG | HEIGHT: 62 IN | HEART RATE: 69 BPM | WEIGHT: 188 LBS | DIASTOLIC BLOOD PRESSURE: 65 MMHG | BODY MASS INDEX: 34.6 KG/M2

## 2024-11-15 DIAGNOSIS — I49.3 PVC'S (PREMATURE VENTRICULAR CONTRACTIONS): ICD-10-CM

## 2024-11-15 DIAGNOSIS — I10 BENIGN ESSENTIAL HYPERTENSION: Primary | Chronic | ICD-10-CM

## 2024-11-15 PROCEDURE — 99214 OFFICE O/P EST MOD 30 MIN: CPT | Performed by: NURSE PRACTITIONER

## 2024-11-15 NOTE — PROGRESS NOTES
Chief Complaint  Follow-up and Hypertension    Subjective            History of Present Illness  Johnna Islas is a 59-year-old female patient who presents to the office today for follow up. She has hypertension and PVC's. She is compliant with medications. She denies any new or worsening cardiac symptoms today.    PMH  Past Medical History:   Diagnosis Date    Allergic     Arrhythmia     Hu's palsy     Diabetes mellitus     Hyperlipidemia     Hypertension     Kidney stone          ALLERGY  Allergies   Allergen Reactions    Fenofibrate Micronized Other (See Comments)     Elevated liver enzymes    Oxycodone-Acetaminophen Itching and Hives    Measles Mumps And Rubella Virus Vaccine Live Other (See Comments)     Mump like symptoms          SURGICALHX  Past Surgical History:   Procedure Laterality Date    ADENOIDECTOMY      APPENDECTOMY      BREAST BIOPSY Left 2023    CARDIAC CATHETERIZATION       SECTION      CHOLECYSTECTOMY      COLONOSCOPY      HYSTERECTOMY  2002    total hysterectomy    TONSILLECTOMY      TOTAL ABDOMINAL HYSTERECTOMY WITH SALPINGO OOPHORECTOMY            SOC  Social History     Socioeconomic History    Marital status:    Tobacco Use    Smoking status: Never     Passive exposure: Never    Smokeless tobacco: Never   Vaping Use    Vaping status: Never Used   Substance and Sexual Activity    Alcohol use: Never    Drug use: Never    Sexual activity: Yes     Partners: Male     Birth control/protection: Post-menopausal, Hysterectomy, Surgical         FAMHX  Family History   Problem Relation Age of Onset    Hypertension Mother     Thyroid disease Mother     Atrial fibrillation Mother     Arthritis Mother     Cancer Mother     Depression Mother     Hyperlipidemia Mother     Arrhythmia Mother     Sleep apnea Mother     Hyperlipidemia Father     Stroke Father     Diabetes Father     Heart disease Father     Arthritis Father     Cancer Father     Arrhythmia Father     Sleep  disorder Father         Night Terrors    Sleep apnea Father     Cancer Sister     Depression Sister     Arrhythmia Sister     Sleep apnea Sister     Kidney disease Maternal Grandmother     Cancer Maternal Grandfather     Diabetes Paternal Grandmother     Cancer Paternal Grandfather     COPD Paternal Grandfather           MEDSIGONLY  Current Outpatient Medications on File Prior to Visit   Medication Sig    acetaminophen (TYLENOL) 325 MG tablet Take 1 tablet by mouth Every 4 (Four) Hours As Needed.    ACIDOPHILUS LACTOBACILLUS PO Take  by mouth.    albuterol sulfate  (90 Base) MCG/ACT inhaler     amLODIPine-Valsartan-HCTZ 5-160-25 MG tablet One tablet PO q24h    aspirin 81 MG EC tablet Take 1 tablet by mouth Daily.    atorvastatin (Lipitor) 20 MG tablet Take 1 tablet by mouth Every Night.    CETIRIZINE HCL PO Take  by mouth.    Cholecalciferol (VITAMIN D-3 PO) Take  by mouth.    ESTRADIOL PO Take 5 mcg by mouth. QD    gabapentin (NEURONTIN) 300 MG capsule Take 1 capsule by mouth 3 (Three) Times a Day.    MAGNESIUM PO Take  by mouth. Magnesium hydroxide 380mg w/ 150mg vitamin c    metFORMIN ER (GLUCOPHAGE-XR) 500 MG 24 hr tablet Take 2 tablets by mouth 2 (Two) Times a Day With Meals.    metoprolol tartrate (LOPRESSOR) 25 MG tablet Take 1 tablet by mouth 2 (Two) Times a Day.    multivitamin with minerals tablet tablet Take 1 tablet by mouth Daily.    Omega-3 Fatty Acids (FISH OIL OMEGA-3 PO) Take  by mouth.    [DISCONTINUED] dapagliflozin Propanediol (Farxiga) 10 MG tablet Take 10 mg by mouth Daily. (Patient not taking: Reported on 11/15/2024)    [DISCONTINUED] icosapent ethyl (VASCEPA) 1 g capsule capsule Take 2 g by mouth 2 (Two) Times a Day With Meals. (Patient not taking: Reported on 11/15/2024)    [DISCONTINUED] promethazine (PHENERGAN) 25 MG tablet Take 1 tablet by mouth Every 6 (Six) Hours As Needed for Nausea or Vomiting. (Patient not taking: Reported on 11/15/2024)     No current facility-administered  "medications on file prior to visit.         Objective   /65   Pulse 69   Ht 157.5 cm (62.01\")   Wt 85.3 kg (188 lb)   BMI 34.38 kg/m²       Physical Exam  Constitutional:       Appearance: She is obese.   HENT:      Head: Normocephalic.   Neck:      Vascular: No carotid bruit.   Cardiovascular:      Rate and Rhythm: Normal rate and regular rhythm.      Pulses: Normal pulses.      Heart sounds: Normal heart sounds. No murmur heard.  Pulmonary:      Effort: Pulmonary effort is normal.      Breath sounds: Normal breath sounds.   Musculoskeletal:      Cervical back: Neck supple.      Right lower leg: No edema.      Left lower leg: No edema.   Skin:     General: Skin is dry.   Neurological:      Mental Status: She is alert and oriented to person, place, and time.   Psychiatric:         Behavior: Behavior normal.       Result Review :   The following data was reviewed by: BROOK Perea on 11/15/2024:  proBNP   Date Value Ref Range Status   11/27/2023 <36.0 0.0 - 900.0 pg/mL Final     CMP          10/30/2024    09:46   CMP   Glucose 133    BUN 13    Creatinine 0.71    EGFR 98.7    Sodium 131    Potassium 4.3    Chloride 95    Calcium 10.3    Total Protein 7.1    Albumin 4.3    Globulin 2.8    Total Bilirubin 0.3    Alkaline Phosphatase 41    AST (SGOT) 27    ALT (SGPT) 26    Albumin/Globulin Ratio 1.5    BUN/Creatinine Ratio 18.3    Anion Gap 9.6          10/30/2024    09:46   CBC w/Diff   WBC 7.06    RBC 4.04    Hemoglobin 12.9    Hematocrit 36.0    MCV 89.1    MCH 31.9    MCHC 35.8    RDW 12.8    Platelets 288    Neutrophil Rel %    Immature Granulocyte Rel %    Lymphocyte Rel %    Monocyte Rel %    Eosinophil Rel %    Basophil Rel %       Lab Results   Component Value Date    TSH 2.340 10/30/2024      Lab Results   Component Value Date    FREET4 1.07 10/30/2024      No results found for: \"DDIMERQUANT\"  Magnesium   Date Value Ref Range Status   11/27/2023 1.9 1.6 - 2.6 mg/dL Final      No results found " "for: \"DIGOXIN\"   Lab Results   Component Value Date    TROPONINT <6 11/27/2023               10/30/2024    09:46   Lipid Panel   Total Cholesterol 163    Triglycerides 666    HDL Cholesterol 23    VLDL Cholesterol 95    LDL Cholesterol  45    LDL/HDL Ratio 0.30        Results for orders placed during the hospital encounter of 03/08/24    Adult Transthoracic Echo Complete W/ Cont if Necessary Per Protocol    Interpretation Summary    Left ventricular systolic function is normal. Calculated left ventricular EF = 66.1%    Left ventricular diastolic function was normal.           Assessment and Plan    Diagnoses and all orders for this visit:    1. Benign essential hypertension (Primary)  Currently controlled and without adverse effects from medication, continue amlodipine-valsartan-hctz 5-160-25 mg daily.    2. PVC's (premature ventricular contractions)  Symptomatically stable, rate controlled, continue metoprolol 25 mg twice daily. Check BMP and magnesium to assess renal function and electrolytes.   -     Basic Metabolic Panel; Future  -     Magnesium; Future        Follow Up   Return in about 1 year (around 11/15/2025) for Follow up with Dr Busch.    Patient was given instructions and counseling regarding her condition or for health maintenance advice. Please see specific information pulled into the AVS if appropriate.     Johnna Islas  reports that she has never smoked. She has never been exposed to tobacco smoke. She has never used smokeless tobacco.          Regina Gambino, APRN  11/15/24  09:55 EST    Dictated Utilizing Dragon Dictation    "

## 2024-12-05 ENCOUNTER — HOSPITAL ENCOUNTER (OUTPATIENT)
Dept: MAMMOGRAPHY | Facility: HOSPITAL | Age: 59
Discharge: HOME | End: 2024-12-05
Admitting: NURSE PRACTITIONER
Payer: COMMERCIAL

## 2024-12-05 DIAGNOSIS — Z12.31 ENCOUNTER FOR SCREENING MAMMOGRAM FOR MALIGNANT NEOPLASM OF BREAST: ICD-10-CM

## 2024-12-05 PROCEDURE — 77063 BREAST TOMOSYNTHESIS BI: CPT

## 2024-12-05 PROCEDURE — 77067 SCR MAMMO BI INCL CAD: CPT

## 2024-12-10 NOTE — PROGRESS NOTES
[facility]     Referring Provider: BROOK Ingram     Chief complaint:  PASH     HPI: Ms. Johnna Islas is a 57 yo woman, seen at the request of BROOK Ingram, for PASH    I personally reviewed her records and summarized her relevant breast history/imagin2021 bilateral screening mammogram at [facility]  No suspicious masses architectural distortion or micro calcifications.  No mammographic evidence of malignancy.  Routine screening in 1 year.  Scattered areas of fibroglandular density.  BI-RADS 1    3/29/2023 bilateral screening mammogram at [facility]  Breast tissue is almost entirely fat.  No mammographic evidence of malignancy.  BI-RADS 1    2023 diagnostic bilateral mammogram and left limited ultrasound at [facility]  FINDINGS:          Mammogram:  Corresponding to the patient's site of focal left breast pain, which was marked by a square shaped   marker, there is a new 2 cm focal asymmetry in the lower central left breast, anterior to middle   depth.  No suspicious mass, calcifications, or architectural distortion is seen in either breast.  Ultrasound:  Corresponding to the patient's site of focal left breast pain and mammographic asymmetry, there is   a 2.2 x 0.6 x 1.1 cm hypoechoic mass or patchy fibroglandular tissue at 06:00, 1 cm from the   nipple.  Given the mammographic and sonographic features, this may represent PASH.  Since this is new on   mammogram, recommend ultrasound-guided biopsy for definitive diagnosis.  IMPRESSION:  Recommend ultrasound-guided biopsy of the left breast.  I discussed results with the patient following the exam.  She will be scheduled for biopsy, and our   staff will notify the ordering clinician's office.  No mammographic signs of malignancy in the right breast.  RECOMMENDATION(S):               ULTRASOUND-GUIDED CORE BIOPSY: LEFT BREAST    BIRADS:               DIAGNOSTIC CATEGORY  4    12/11/2023 left breast ultrasound-guided biopsy at [facility]  ADDENDUM:   Final surgical pathology report describes PASH.  Imaging findings of pathology findings are concordant.  Given the size of the lesion, I would recommend surgical referral for consideration of excision.      She has a personal history of diabetes, hypertension, hyperlipidemia, GERD, total hysterectomy in 2002, estrogen therapy since 2002.      She denies any family history of breast or ovarian cancer.     Today she presents with concerns regarding biopsy results of PASH.  She denies any breast lumps, pain, skin changes, or nipple discharge.    12/12/2024 interval history  Patient presenting to the office today for routine follow-up.  On 12/5/2024 she had bilateral screening mammogram that resulted as BI-RADS 2.  She has no new breast complaints or concerns today.      Review of Systems - Oncology    Medications:    Current Outpatient Medications:     acetaminophen (TYLENOL) 325 MG tablet, Take 1 tablet by mouth Every 4 (Four) Hours As Needed., Disp: , Rfl:     ACIDOPHILUS LACTOBACILLUS PO, Take  by mouth., Disp: , Rfl:     albuterol sulfate  (90 Base) MCG/ACT inhaler, , Disp: , Rfl:     amLODIPine-Valsartan-HCTZ 5-160-25 MG tablet, One tablet PO q24h, Disp: 90 tablet, Rfl: 3    aspirin 81 MG EC tablet, Take 1 tablet by mouth Daily., Disp: 90 tablet, Rfl: 3    atorvastatin (Lipitor) 20 MG tablet, Take 1 tablet by mouth Every Night., Disp: 90 tablet, Rfl: 3    CETIRIZINE HCL PO, Take  by mouth., Disp: , Rfl:     Cholecalciferol (VITAMIN D-3 PO), Take  by mouth., Disp: , Rfl:     ESTRADIOL PO, Take 5 mcg by mouth. QD, Disp: , Rfl:     gabapentin (NEURONTIN) 300 MG capsule, Take 1 capsule by mouth 3 (Three) Times a Day., Disp: 270 capsule, Rfl: 1    MAGNESIUM PO, Take  by mouth. Magnesium hydroxide 380mg w/ 150mg vitamin c, Disp: , Rfl:     metFORMIN ER (GLUCOPHAGE-XR) 500 MG 24 hr tablet, Take 2 tablets by mouth 2 (Two)  Times a Day With Meals., Disp: 360 tablet, Rfl: 3    metoprolol tartrate (LOPRESSOR) 25 MG tablet, Take 1 tablet by mouth 2 (Two) Times a Day., Disp: 180 tablet, Rfl: 3    multivitamin with minerals tablet tablet, Take 1 tablet by mouth Daily., Disp: , Rfl:     Omega-3 Fatty Acids (FISH OIL OMEGA-3 PO), Take  by mouth., Disp: , Rfl:     Allergies:  Allergies   Allergen Reactions    Fenofibrate Micronized Other (See Comments)     Elevated liver enzymes    Oxycodone-Acetaminophen Itching and Hives    Measles Mumps And Rubella Virus Vaccine Live Other (See Comments)     Mump like symptoms       Medical history:  Past Medical History:   Diagnosis Date    Allergic     Arrhythmia     Hu's palsy     Diabetes mellitus     Hyperlipidemia     Hypertension     Kidney stone        Surgical History:  Past Surgical History:   Procedure Laterality Date    ADENOIDECTOMY      APPENDECTOMY      BREAST BIOPSY Left 2023    CARDIAC CATHETERIZATION  2010     SECTION      CHOLECYSTECTOMY      COLONOSCOPY  2020    HYSTERECTOMY  2002    total hysterectomy    TONSILLECTOMY      TOTAL ABDOMINAL HYSTERECTOMY WITH SALPINGO OOPHORECTOMY         Family History:  Family History   Problem Relation Age of Onset    Hypertension Mother     Thyroid disease Mother     Atrial fibrillation Mother     Arthritis Mother     Cancer Mother     Depression Mother     Hyperlipidemia Mother     Arrhythmia Mother     Sleep apnea Mother     Hyperlipidemia Father     Stroke Father     Diabetes Father     Heart disease Father     Arthritis Father     Cancer Father     Arrhythmia Father     Sleep disorder Father         Night Terrors    Sleep apnea Father     Cancer Sister     Depression Sister     Arrhythmia Sister     Sleep apnea Sister     Kidney disease Maternal Grandmother     Cancer Maternal Grandfather     Diabetes Paternal Grandmother     Cancer Paternal Grandfather     COPD Paternal Grandfather        Social History:   Social History      Socioeconomic History    Marital status:    Tobacco Use    Smoking status: Never     Passive exposure: Never    Smokeless tobacco: Never   Vaping Use    Vaping status: Never Used   Substance and Sexual Activity    Alcohol use: Never    Drug use: Never    Sexual activity: Yes     Partners: Male     Birth control/protection: Post-menopausal, Hysterectomy, Surgical     Patient drinks 2 servings of caffeine per day.       GYNECOLOGIC HISTORY:   . P: 3. AB: 2.  Last menstrual period: 37  Age at menarche: 13  Age at first childbirth: 25  Lactation/How long: N/A  Age at menopause: 37 pt had total hysterectomy in   Total years of oral contraceptive use: 10+  Total years of hormone replacement therapy: 10+ years       Physical Exam  There were no vitals filed for this visit.    ECOG 0 - Asymptomatic  General: NAD, well appearing  Psych: a&o x 3, normal mood and affect  Eyes: EOMI, no scleral icterus  ENMT: neck supple without masses or thyromegaly, mucus membranes moist  Resp: normal effort, CTAB  CV: RRR, no murmurs, no edema   GI: soft, NT, ND  MSK: normal gait, normal ROM in bilateral shoulders  Lymph nodes: no cervical, supraclavicular or axillary lymphadenopathy  Breast: symmetric, small  Right: No visible abnormalities on inspection while seated, with arms raised or hands on hips. No masses, skin changes, or nipple abnormalities.  Left: No visible abnormalities on inspection while seated, with arms raised or hands on hips. No masses, skin changes, or nipple abnormalities.    Imagin2024 bilateral screening mammogram at [facility]  Findings:   Left breast asymmetry has decreased in size compared to 2023  mammogram and now contains a biopsy clip.  No suspicious masses, suspicious microcalcifications, or architectural  distortions are identified.  IMPRESSION:  No mammographic evidence of malignancy.  Recommend annual screening  mammography.  BI-RADS ASSESSMENT: BI-RADS 2      Assessment:     PASH  Estrogen therapy for 22 years      Discussion:  Pseudoangiomatous stromal hyperplasia (PASH) is a type of non-cancerous breast lesion. Tumorous PASH presents as a firm, painless breast mass or a dense region on a mammogram.[1][2][3] The underlying cause of PASH is unclear. There is some evidence in the literature to suggest that hormonal factors may play a role. While PASH itself is benign, it may mimic cancer (specifically angiosarcoma). For this reason, a biopsy may be recommended.  ( [facility], 2021)  According to T C8 she is average risk for breast cancer at 6.1%    Plan:  Considering she is not high risk for breast cancer and has normal imaging with no new issues I will not be giving her a follow-up in the office.  If any new issues arise in the future I would be happy to see her back.  She will be due for a bilateral screening mammogram in December 2025 which can be ordered by her PCP    BROOK Morgan    I have spent 30 mins in face to face time with the patient and in chart review.    CC:  BROOK Ingram Wesley, DO EMR Dragon/transcription disclaimer:  Dictated using Dragon dictation

## 2024-12-12 ENCOUNTER — OFFICE VISIT (OUTPATIENT)
Dept: SURGERY | Facility: CLINIC | Age: 59
End: 2024-12-12
Payer: COMMERCIAL

## 2024-12-12 VITALS
WEIGHT: 185 LBS | OXYGEN SATURATION: 97 % | DIASTOLIC BLOOD PRESSURE: 84 MMHG | HEART RATE: 82 BPM | BODY MASS INDEX: 34.04 KG/M2 | HEIGHT: 62 IN | SYSTOLIC BLOOD PRESSURE: 152 MMHG

## 2024-12-12 DIAGNOSIS — R92.8 ABNORMAL MAMMOGRAM: Primary | ICD-10-CM

## 2024-12-12 PROCEDURE — 99213 OFFICE O/P EST LOW 20 MIN: CPT | Performed by: NURSE PRACTITIONER

## 2024-12-18 ENCOUNTER — HOSPITAL ENCOUNTER (OUTPATIENT)
Dept: BONE DENSITY | Facility: HOSPITAL | Age: 59
Discharge: HOME | End: 2024-12-18
Admitting: FAMILY MEDICINE
Payer: COMMERCIAL

## 2024-12-18 DIAGNOSIS — M81.6 LOCALIZED OSTEOPOROSIS WITHOUT CURRENT PATHOLOGICAL FRACTURE: ICD-10-CM

## 2024-12-18 PROCEDURE — 77080 DXA BONE DENSITY AXIAL: CPT

## 2025-03-05 DIAGNOSIS — J01.10 ACUTE NON-RECURRENT FRONTAL SINUSITIS: Primary | ICD-10-CM

## 2025-03-05 RX ORDER — AZITHROMYCIN 250 MG/1
TABLET, FILM COATED ORAL
Qty: 6 TABLET | Refills: 0 | Status: SHIPPED | OUTPATIENT
Start: 2025-03-05

## 2025-03-11 DIAGNOSIS — I10 BENIGN ESSENTIAL HYPERTENSION: ICD-10-CM

## 2025-03-11 RX ORDER — AMLODIPINE,VALSARTAN AND HYDROCHLOROTHIAZIDE 5; 25; 160 MG/1; MG/1; MG/1
1 TABLET, FILM COATED ORAL DAILY
Qty: 90 TABLET | Refills: 3 | Status: SHIPPED | OUTPATIENT
Start: 2025-03-11

## 2025-04-30 ENCOUNTER — TELEPHONE (OUTPATIENT)
Dept: CARDIOLOGY | Facility: CLINIC | Age: 60
End: 2025-04-30
Payer: COMMERCIAL

## 2025-04-30 NOTE — TELEPHONE ENCOUNTER
"    Caller: Johnna Islas \"Larisa\"    Relationship to patient: Self    Best call back number:   Telephone Information:   Mobile [phone]     Chief complaint: BP HAS BEEN ELEVATED FOR THE LAST 2 TO 3 DAYS   NO PALPITATIONS NO CHEST PAIN OR PRESSURE OCCASIONAL PVCS BUT IT IS NOT THAT DOES NOT FEEL WELL    Type of visit: FOLLOW UP    Requested date: ASAP     Additional notes:SHE HAS A PCP APPT ON 5-13-25 BUT DOES NOT WANT THEM TO CHANGE HER MEDICATIONS AND DOES NOT THINK SHE CAN WAIT TILL THEN TO COME IN.    BP READING /95 THIS MORNING   140/81 AN HOUR AND A HALF AFTER MEDICINE  RECENT READINGS OVER THE LAST FEW DAYS  158/98, 141/74, 159/91, 161/93, 162/90, 172/82    GENERALLY HAS STAYED IN /80 /90 RANGE SHE HAS HAD SOME HIGHER THAN THAT                "

## 2025-04-30 NOTE — TELEPHONE ENCOUNTER
Change amlodipine-valsartan-hctz dose to -25 mg daily  Monitor BP twice daily for the next week and send in log for review

## 2025-05-01 RX ORDER — AMLODIPINE,VALSARTAN AND HYDROCHLOROTHIAZIDE 10; 25; 320 MG/1; MG/1; MG/1
1 TABLET, FILM COATED ORAL DAILY
Qty: 90 TABLET | Refills: 3 | Status: SHIPPED | OUTPATIENT
Start: 2025-05-01

## 2025-05-13 ENCOUNTER — LAB (OUTPATIENT)
Dept: LAB | Facility: HOSPITAL | Age: 60
End: 2025-05-13
Payer: COMMERCIAL

## 2025-05-13 ENCOUNTER — OFFICE VISIT (OUTPATIENT)
Dept: FAMILY MEDICINE CLINIC | Facility: CLINIC | Age: 60
End: 2025-05-13
Payer: COMMERCIAL

## 2025-05-13 VITALS
BODY MASS INDEX: 33.68 KG/M2 | OXYGEN SATURATION: 96 % | SYSTOLIC BLOOD PRESSURE: 130 MMHG | WEIGHT: 183 LBS | RESPIRATION RATE: 16 BRPM | TEMPERATURE: 97.8 F | HEIGHT: 62 IN | DIASTOLIC BLOOD PRESSURE: 68 MMHG | HEART RATE: 65 BPM

## 2025-05-13 DIAGNOSIS — E11.610 CONTROLLED TYPE 2 DIABETES MELLITUS WITH DIABETIC NEUROPATHIC ARTHROPATHY, WITHOUT LONG-TERM CURRENT USE OF INSULIN: Primary | Chronic | ICD-10-CM

## 2025-05-13 DIAGNOSIS — E66.811 CLASS 1 OBESITY DUE TO EXCESS CALORIES WITH SERIOUS COMORBIDITY AND BODY MASS INDEX (BMI) OF 33.0 TO 33.9 IN ADULT: Chronic | ICD-10-CM

## 2025-05-13 DIAGNOSIS — E78.1 HYPERTRIGLYCERIDEMIA: Chronic | ICD-10-CM

## 2025-05-13 DIAGNOSIS — E11.42 DIABETIC POLYNEUROPATHY ASSOCIATED WITH TYPE 2 DIABETES MELLITUS: Chronic | ICD-10-CM

## 2025-05-13 DIAGNOSIS — J01.10 ACUTE NON-RECURRENT FRONTAL SINUSITIS: ICD-10-CM

## 2025-05-13 DIAGNOSIS — E66.09 CLASS 1 OBESITY DUE TO EXCESS CALORIES WITH SERIOUS COMORBIDITY AND BODY MASS INDEX (BMI) OF 33.0 TO 33.9 IN ADULT: Chronic | ICD-10-CM

## 2025-05-13 DIAGNOSIS — J30.9 CHRONIC ALLERGIC RHINITIS: ICD-10-CM

## 2025-05-13 DIAGNOSIS — I49.3 PVC'S (PREMATURE VENTRICULAR CONTRACTIONS): ICD-10-CM

## 2025-05-13 DIAGNOSIS — K21.9 GASTROESOPHAGEAL REFLUX DISEASE WITHOUT ESOPHAGITIS: Chronic | ICD-10-CM

## 2025-05-13 DIAGNOSIS — E11.610 CONTROLLED TYPE 2 DIABETES MELLITUS WITH DIABETIC NEUROPATHIC ARTHROPATHY, WITHOUT LONG-TERM CURRENT USE OF INSULIN: Chronic | ICD-10-CM

## 2025-05-13 DIAGNOSIS — G57.93 NEUROPATHIC PAIN OF BOTH FEET: Chronic | ICD-10-CM

## 2025-05-13 LAB
ALBUMIN SERPL-MCNC: 4.5 G/DL (ref 3.5–5.2)
ALBUMIN/GLOB SERPL: 1.6 G/DL
ALP SERPL-CCNC: 49 U/L (ref 39–117)
ALT SERPL W P-5'-P-CCNC: 25 U/L (ref 1–33)
ANION GAP SERPL CALCULATED.3IONS-SCNC: 7.7 MMOL/L (ref 5–15)
AST SERPL-CCNC: 25 U/L (ref 1–32)
BILIRUB SERPL-MCNC: 0.4 MG/DL (ref 0–1.2)
BUN SERPL-MCNC: 9 MG/DL (ref 6–20)
BUN/CREAT SERPL: 14.5 (ref 7–25)
CALCIUM SPEC-SCNC: 10.5 MG/DL (ref 8.6–10.5)
CHLORIDE SERPL-SCNC: 94 MMOL/L (ref 98–107)
CHOLEST SERPL-MCNC: 132 MG/DL (ref 0–200)
CO2 SERPL-SCNC: 26.3 MMOL/L (ref 22–29)
CREAT SERPL-MCNC: 0.62 MG/DL (ref 0.57–1)
DEPRECATED RDW RBC AUTO: 38.6 FL (ref 37–54)
EGFRCR SERPLBLD CKD-EPI 2021: 103.4 ML/MIN/1.73
ERYTHROCYTE [DISTWIDTH] IN BLOOD BY AUTOMATED COUNT: 12.2 % (ref 12.3–15.4)
GLOBULIN UR ELPH-MCNC: 2.8 GM/DL
GLUCOSE SERPL-MCNC: 131 MG/DL (ref 65–99)
HBA1C MFR BLD: 6.2 % (ref 4.8–5.6)
HCT VFR BLD AUTO: 38.2 % (ref 34–46.6)
HDLC SERPL-MCNC: 25 MG/DL (ref 40–60)
HGB BLD-MCNC: 13.1 G/DL (ref 12–15.9)
LDLC SERPL CALC-MCNC: 45 MG/DL (ref 0–100)
LDLC/HDLC SERPL: 0.94 {RATIO}
MAGNESIUM SERPL-MCNC: 1.9 MG/DL (ref 1.6–2.6)
MCH RBC QN AUTO: 29.8 PG (ref 26.6–33)
MCHC RBC AUTO-ENTMCNC: 34.3 G/DL (ref 31.5–35.7)
MCV RBC AUTO: 87 FL (ref 79–97)
PLATELET # BLD AUTO: 314 10*3/MM3 (ref 140–450)
PMV BLD AUTO: 9.4 FL (ref 6–12)
POTASSIUM SERPL-SCNC: 4.6 MMOL/L (ref 3.5–5.2)
PROT SERPL-MCNC: 7.3 G/DL (ref 6–8.5)
RBC # BLD AUTO: 4.39 10*6/MM3 (ref 3.77–5.28)
SODIUM SERPL-SCNC: 128 MMOL/L (ref 136–145)
T4 FREE SERPL-MCNC: 1.15 NG/DL (ref 0.92–1.68)
TRIGL SERPL-MCNC: 417 MG/DL (ref 0–150)
TSH SERPL DL<=0.05 MIU/L-ACNC: 1.43 UIU/ML (ref 0.27–4.2)
VLDLC SERPL-MCNC: 62 MG/DL (ref 5–40)
WBC NRBC COR # BLD AUTO: 6.92 10*3/MM3 (ref 3.4–10.8)

## 2025-05-13 PROCEDURE — 80050 GENERAL HEALTH PANEL: CPT

## 2025-05-13 PROCEDURE — 84439 ASSAY OF FREE THYROXINE: CPT

## 2025-05-13 PROCEDURE — 80061 LIPID PANEL: CPT

## 2025-05-13 PROCEDURE — 36415 COLL VENOUS BLD VENIPUNCTURE: CPT

## 2025-05-13 PROCEDURE — 83735 ASSAY OF MAGNESIUM: CPT

## 2025-05-13 PROCEDURE — 82043 UR ALBUMIN QUANTITATIVE: CPT

## 2025-05-13 PROCEDURE — 99214 OFFICE O/P EST MOD 30 MIN: CPT | Performed by: FAMILY MEDICINE

## 2025-05-13 PROCEDURE — 83036 HEMOGLOBIN GLYCOSYLATED A1C: CPT

## 2025-05-13 PROCEDURE — 82570 ASSAY OF URINE CREATININE: CPT

## 2025-05-13 RX ORDER — ATORVASTATIN CALCIUM 20 MG/1
20 TABLET, FILM COATED ORAL NIGHTLY
Qty: 90 TABLET | Refills: 3 | Status: SHIPPED | OUTPATIENT
Start: 2025-05-13

## 2025-05-13 RX ORDER — PNV NO.95/FERROUS FUM/FOLIC AC 28MG-0.8MG
1 TABLET ORAL DAILY
Qty: 90 CAPSULE | Refills: 3 | Status: SHIPPED | OUTPATIENT
Start: 2025-05-13

## 2025-05-13 RX ORDER — METFORMIN HYDROCHLORIDE 500 MG/1
1000 TABLET, EXTENDED RELEASE ORAL 2 TIMES DAILY WITH MEALS
Qty: 360 TABLET | Refills: 3 | Status: SHIPPED | OUTPATIENT
Start: 2025-05-13

## 2025-05-13 RX ORDER — METOPROLOL TARTRATE 25 MG/1
25 TABLET, FILM COATED ORAL 2 TIMES DAILY
Qty: 180 TABLET | Refills: 3 | Status: SHIPPED | OUTPATIENT
Start: 2025-05-13

## 2025-05-13 RX ORDER — GABAPENTIN 300 MG/1
300 CAPSULE ORAL 3 TIMES DAILY
Qty: 270 CAPSULE | Refills: 1 | Status: SHIPPED | OUTPATIENT
Start: 2025-05-13

## 2025-05-13 RX ORDER — AZELASTINE 1 MG/ML
2 SPRAY, METERED NASAL DAILY
Qty: 30 ML | Refills: 5 | Status: SHIPPED | OUTPATIENT
Start: 2025-05-13

## 2025-05-13 RX ORDER — PNV NO.95/FERROUS FUM/FOLIC AC 28MG-0.8MG
3 TABLET ORAL DAILY
Qty: 270 CAPSULE | Refills: 3 | Status: CANCELLED | OUTPATIENT
Start: 2025-05-13

## 2025-05-13 NOTE — PROGRESS NOTES
Chief Complaint  Allergies and Diabetes    Subjective          Johnna Islas presents to [facility]  Allergies  Symptoms include fatigue.    Pertinent negative symptoms include no abdominal pain, no anorexia, no joint pain, no change in stool, no chest pain, no chills, no congestion, no cough, no diaphoresis, no fever, no headaches, no joint swelling, no myalgias, no nausea, no neck pain, no numbness, no rash, no sore throat, no swollen glands, no dysuria, no vertigo, no visual change, no vomiting and no weakness.   Diabetes  Associated symptoms:     fatigue      no chest pain, no visual change and no weakness    Hypoglycemia symptoms:     no headaches      Medication refills  Symptoms are: chronic.   Onset was at an unknown time.   Symptoms occur: daily.  Symptoms include: fatigue.   Pertinent negative symptoms include no abdominal pain, no anorexia, no joint pain, no change in stool, no chest pain, no chills, no congestion, no cough, no diaphoresis, no fever, no headaches, no joint swelling, no myalgias, no nausea, no neck pain, no numbness, no rash, no sore throat, no swollen glands, no dysuria, no vertigo, no visual change, no vomiting and no weakness.       History of Present Illness  The patient is an 89-year-old female who presents for follow-up on allergies and diabetes.    She has been experiencing weight loss, with her weight previously recorded at 189.6 pounds. She is currently on metformin and manages her diet and weight, with her BMI down to 33. Her last A1c was under 6.5, and it will be rechecked today along with other labs. She is also taking gabapentin for neuropathy related to diabetes. Her contract was reviewed and updated, with the last refill in 02/2025.    She reports that her blood pressure is well controlled. She is on amlodipine, losartan, and HCTZ for blood pressure management, with a current reading of 130/68.    She experienced severe stomach  "discomfort and reflux, leading to nausea after taking four tablets of omega-3 fatty acids. She reduced the dosage to two tablets daily for approximately one to two weeks, then increased it to three tablets daily for a few weeks. Currently, she takes two tablets at night and one in the morning, along with an omega supplement that does not cause reflux. Despite these adjustments, she continues to experience chest burning sensations, regardless of whether she takes the medication with or without food. She is on omega-3 fatty acids to help lower triglycerides and has been on statin medication before but did not tolerate it well. She is now taking Lipitor to help manage her cholesterol levels, which will be rechecked to see if there is improvement.    She has been experiencing ear pain since Saturday, which she attributes to mowing the yard. She is currently taking Zyrtec daily and has added Claritin to her morning routine. She reports experiencing postnasal drip at night, which disrupts her sleep.    FAMILY HISTORY  Her mother has atrial fibrillation and has been hospitalized recently. Her sister has had an ablation procedure.      Objective   Vital Signs:   /68 (BP Location: Right arm, Patient Position: Sitting, Cuff Size: Adult)   Pulse 65   Temp 97.8 °F (36.6 °C)   Resp 16   Ht 157.5 cm (62\")   Wt 83 kg (183 lb)   SpO2 96%   BMI 33.47 kg/m²       Physical Exam  Vitals reviewed.   Constitutional:       Appearance: Normal appearance. She is well-developed.   HENT:      Head: Normocephalic and atraumatic.      Right Ear: External ear normal.      Left Ear: External ear normal.      Nose: Nose normal. Congestion and rhinorrhea present.   Eyes:      Conjunctiva/sclera: Conjunctivae normal.      Pupils: Pupils are equal, round, and reactive to light.   Cardiovascular:      Rate and Rhythm: Normal rate.   Pulmonary:      Effort: Pulmonary effort is normal.      Breath sounds: Normal breath sounds.   Abdominal: "      General: There is no distension.   Skin:     General: Skin is warm and dry.   Neurological:      Mental Status: She is alert and oriented to person, place, and time. Mental status is at baseline.   Psychiatric:         Mood and Affect: Mood and affect normal.         Behavior: Behavior normal.         Thought Content: Thought content normal.         Judgment: Judgment normal.          Result Review :   The following data was reviewed by: Robson Jasso DO on 05/13/2025:  Common labs          10/30/2024    09:42 10/30/2024    09:46 5/13/2025    12:03   Common Labs   Glucose  133  131    BUN  13  9    Creatinine  0.71  0.62    Sodium  131  128    Potassium  4.3  4.6    Chloride  95  94    Calcium  10.3  10.5    Albumin  4.3  4.5    Total Bilirubin  0.3  0.4    Alkaline Phosphatase  41  49    AST (SGOT)  27  25    ALT (SGPT)  26  25    WBC  7.06  6.92    Hemoglobin  12.9  13.1    Hematocrit  36.0  38.2    Platelets  288  314    Total Cholesterol  163  132    Triglycerides  666  417    HDL Cholesterol  23  25    LDL Cholesterol   45  45    Hemoglobin A1C  6.20  6.20    Microalbumin, Urine 9.0   9.6           Results  Labs   - A1c: Under 6.5                 Assessment and Plan    Diagnoses and all orders for this visit:    1. Controlled type 2 diabetes mellitus with diabetic neuropathic arthropathy, without long-term current use of insulin (Primary)  -     metFORMIN ER (GLUCOPHAGE-XR) 500 MG 24 hr tablet; Take 2 tablets by mouth 2 (Two) Times a Day With Meals.  Dispense: 360 tablet; Refill: 3  -     Lipid Panel; Future  -     Hemoglobin A1c; Future  -     Comprehensive Metabolic Panel; Future  -     CBC (No Diff); Future  -     TSH+Free T4; Future  -     Microalbumin / Creatinine Urine Ratio - Urine, Clean Catch; Future    2. Diabetic polyneuropathy associated with type 2 diabetes mellitus  -     gabapentin (NEURONTIN) 300 MG capsule; Take 1 capsule by mouth 3 (Three) Times a Day.  Dispense: 270 capsule; Refill:  1    3. Neuropathic pain of both feet  -     gabapentin (NEURONTIN) 300 MG capsule; Take 1 capsule by mouth 3 (Three) Times a Day.  Dispense: 270 capsule; Refill: 1    4. Hypertriglyceridemia  -     Lipid Panel; Future    5. Gastroesophageal reflux disease without esophagitis    6. Class 1 obesity due to excess calories with serious comorbidity and body mass index (BMI) of 33.0 to 33.9 in adult    7. Chronic allergic rhinitis  -     azelastine (ASTELIN) 0.1 % nasal spray; Administer 2 sprays into the nostril(s) as directed by provider Daily. Use in each nostril as directed  Dispense: 30 mL; Refill: 5    8. Acute non-recurrent frontal sinusitis  -     amoxicillin-clavulanate (AUGMENTIN) 875-125 MG per tablet; Take 1 tablet by mouth 2 (Two) Times a Day.  Dispense: 28 tablet; Refill: 0    Other orders  -     metoprolol tartrate (LOPRESSOR) 25 MG tablet; Take 1 tablet by mouth 2 (Two) Times a Day.  Dispense: 180 tablet; Refill: 3  -     atorvastatin (Lipitor) 20 MG tablet; Take 1 tablet by mouth Every Night.  Dispense: 90 tablet; Refill: 3  -     Omega-3 Fatty Acids (Fish Oil Omega-3) 1000 MG capsule; Take 1 capsule by mouth Daily.  Dispense: 90 capsule; Refill: 3        Assessment & Plan  1. Diabetes.  - Her last A1c was under 6.5.  - She is on metformin and managing her diet and weight, with her BMI down to 33.  - She is also taking gabapentin for neuropathy related to diabetes.  - A recheck of her A1c and other labs will be done today.    2. Blood pressure management.  - Her blood pressure is well controlled at 130/68.  - She is on amlodipine, losartan, and HCTZ.  - Blood pressure readings are consistently within the target range.  - Continue current medications for blood pressure management.    3. Cholesterol management.  - She is on omega-3 fatty acids to help lower triglycerides and is taking Lipitor now to help with cholesterol.  - She will continue taking three capsules of omega-3 fatty acids daily, two at night  and one in the morning.  - Previous statin therapy was not well tolerated.  - A recheck of her cholesterol levels will be done to see if there is improvement.    4. Allergies.  - She is currently taking Zyrtec daily and has added Claritin in the morning.  - Reports experiencing postnasal drip at night, which disrupts her sleep.  - Fluid in the ear noted, but no redness.  - Continue current allergy medications and monitor symptoms.    The 10-year ASCVD risk score (Murphy DK, et al., 2019) is: 9%    Values used to calculate the score:      Age: 59 years      Sex: Female      Is Non- : No      Diabetic: Yes      Tobacco smoker: No      Systolic Blood Pressure: 130 mmHg      Is BP treated: Yes      HDL Cholesterol: 25 mg/dL      Total Cholesterol: 132 mg/dL          Follow Up   Return in about 6 months (around 11/13/2025), or if symptoms worsen or fail to improve, for Next scheduled follow up, Recheck, Labs before, Physical.    Patient was given instructions and counseling regarding her condition or for health maintenance advice. Please see specific information pulled into the AVS if appropriate.       Transcribed from ambient dictation for Robson Jasso DO by Robson Jasso DO.  05/13/25   11:20 EDT    Patient or patient representative verbalized consent for the use of Ambient Listening during the visit with  Robson Jasso DO for chart documentation. 5/14/2025  09:54 EDT

## 2025-05-14 LAB
ALBUMIN UR-MCNC: 9.6 MG/DL
CREAT UR-MCNC: 93.1 MG/DL
MICROALBUMIN/CREAT UR: 103.1 MG/G (ref 0–29)

## 2025-05-15 RX ORDER — DAPAGLIFLOZIN 5 MG/1
5 TABLET, FILM COATED ORAL DAILY
Qty: 30 TABLET | Refills: 2 | Status: SHIPPED | OUTPATIENT
Start: 2025-05-15

## 2025-05-19 RX ORDER — ICOSAPENT ETHYL 1 G/1
2 CAPSULE ORAL 2 TIMES DAILY WITH MEALS
Qty: 120 CAPSULE | Refills: 5 | OUTPATIENT
Start: 2025-05-19

## 2025-08-18 RX ORDER — DAPAGLIFLOZIN 5 MG/1
5 TABLET, FILM COATED ORAL DAILY
Qty: 30 TABLET | Refills: 2 | Status: SHIPPED | OUTPATIENT
Start: 2025-08-18